# Patient Record
Sex: MALE | Race: OTHER
[De-identification: names, ages, dates, MRNs, and addresses within clinical notes are randomized per-mention and may not be internally consistent; named-entity substitution may affect disease eponyms.]

---

## 2018-02-28 ENCOUNTER — HOSPITAL ENCOUNTER (EMERGENCY)
Dept: HOSPITAL 15 - ER | Age: 58
LOS: 1 days | Discharge: HOME | End: 2018-03-01
Payer: COMMERCIAL

## 2018-02-28 VITALS — HEIGHT: 69 IN | WEIGHT: 267 LBS | BODY MASS INDEX: 39.55 KG/M2
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
## 2018-02-28 DIAGNOSIS — S76.311A: ICD-10-CM
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
## 2018-02-28 DIAGNOSIS — Z88.0: ICD-10-CM

## 2018-02-28 DIAGNOSIS — S76.312A: Primary | ICD-10-CM
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
## 2018-02-28 PROCEDURE — 99283 EMERGENCY DEPT VISIT LOW MDM: CPT

## 2018-02-28 PROCEDURE — 93005 ELECTROCARDIOGRAM TRACING: CPT

## 2018-02-28 PROCEDURE — 96372 THER/PROPH/DIAG INJ SC/IM: CPT

## 2018-03-01 VITALS — DIASTOLIC BLOOD PRESSURE: 95 MMHG | SYSTOLIC BLOOD PRESSURE: 140 MMHG

## 2018-12-29 ENCOUNTER — HOSPITAL ENCOUNTER (EMERGENCY)
Dept: HOSPITAL 15 - ER | Age: 58
Discharge: HOME | End: 2018-12-29
Payer: COMMERCIAL

## 2018-12-29 VITALS — WEIGHT: 242 LBS | BODY MASS INDEX: 34.65 KG/M2 | HEIGHT: 70 IN

## 2018-12-29 VITALS — SYSTOLIC BLOOD PRESSURE: 142 MMHG | DIASTOLIC BLOOD PRESSURE: 81 MMHG

## 2018-12-29 DIAGNOSIS — I10: ICD-10-CM

## 2018-12-29 DIAGNOSIS — R94.5: ICD-10-CM

## 2018-12-29 DIAGNOSIS — Z87.891: ICD-10-CM

## 2018-12-29 DIAGNOSIS — J20.9: Primary | ICD-10-CM

## 2018-12-29 LAB
ALBUMIN SERPL-MCNC: 4 G/DL (ref 3.4–5)
ALP SERPL-CCNC: 127 U/L (ref 45–117)
ALT SERPL-CCNC: 65 U/L (ref 16–61)
AMYLASE SERPL-CCNC: 42 U/L (ref 25–115)
ANION GAP SERPL CALCULATED.3IONS-SCNC: 5 MMOL/L (ref 5–15)
APTT PPP: 25.4 SEC (ref 23.78–33.04)
BILIRUB SERPL-MCNC: 0.3 MG/DL (ref 0.2–1)
BUN SERPL-MCNC: 12 MG/DL (ref 7–18)
BUN/CREAT SERPL: 10.9
CALCIUM SERPL-MCNC: 8.9 MG/DL (ref 8.5–10.1)
CHLORIDE SERPL-SCNC: 104 MMOL/L (ref 98–107)
CO2 SERPL-SCNC: 27 MMOL/L (ref 21–32)
GLUCOSE SERPL-MCNC: 94 MG/DL (ref 74–106)
HCT VFR BLD AUTO: 43.6 % (ref 41–53)
HGB BLD-MCNC: 14.6 G/DL (ref 13.5–17.5)
INR PPP: 0.91 (ref 0.9–1.15)
LIPASE SERPL-CCNC: 151 U/L (ref 73–393)
MAGNESIUM SERPL-MCNC: 2.4 MG/DL (ref 1.6–2.6)
MCH RBC QN AUTO: 29.6 PG (ref 28–32)
MCV RBC AUTO: 88.7 FL (ref 80–100)
NRBC BLD QL AUTO: 0 %
POTASSIUM SERPL-SCNC: 4 MMOL/L (ref 3.5–5.1)
PROT SERPL-MCNC: 7.9 G/DL (ref 6.4–8.2)
PROTHROMBIN TIME: 9.8 SEC (ref 9.27–12.13)
SODIUM SERPL-SCNC: 136 MMOL/L (ref 136–145)

## 2018-12-29 PROCEDURE — 84484 ASSAY OF TROPONIN QUANT: CPT

## 2018-12-29 PROCEDURE — 36415 COLL VENOUS BLD VENIPUNCTURE: CPT

## 2018-12-29 PROCEDURE — 83735 ASSAY OF MAGNESIUM: CPT

## 2018-12-29 PROCEDURE — 82150 ASSAY OF AMYLASE: CPT

## 2018-12-29 PROCEDURE — 83690 ASSAY OF LIPASE: CPT

## 2018-12-29 PROCEDURE — 80053 COMPREHEN METABOLIC PANEL: CPT

## 2018-12-29 PROCEDURE — 85610 PROTHROMBIN TIME: CPT

## 2018-12-29 PROCEDURE — 71045 X-RAY EXAM CHEST 1 VIEW: CPT

## 2018-12-29 PROCEDURE — 85379 FIBRIN DEGRADATION QUANT: CPT

## 2018-12-29 PROCEDURE — 96372 THER/PROPH/DIAG INJ SC/IM: CPT

## 2018-12-29 PROCEDURE — 85025 COMPLETE CBC W/AUTO DIFF WBC: CPT

## 2018-12-29 PROCEDURE — 83605 ASSAY OF LACTIC ACID: CPT

## 2018-12-29 PROCEDURE — 93005 ELECTROCARDIOGRAM TRACING: CPT

## 2018-12-29 PROCEDURE — 87040 BLOOD CULTURE FOR BACTERIA: CPT

## 2018-12-29 PROCEDURE — 94761 N-INVAS EAR/PLS OXIMETRY MLT: CPT

## 2018-12-29 PROCEDURE — 85730 THROMBOPLASTIN TIME PARTIAL: CPT

## 2018-12-29 PROCEDURE — 99284 EMERGENCY DEPT VISIT MOD MDM: CPT

## 2020-06-05 ENCOUNTER — HOSPITAL ENCOUNTER (INPATIENT)
Dept: HOSPITAL 15 - ER | Age: 60
LOS: 21 days | Discharge: HOME | DRG: 56 | End: 2020-06-26
Attending: INTERNAL MEDICINE | Admitting: INTERNAL MEDICINE
Payer: COMMERCIAL

## 2020-06-05 VITALS — BODY MASS INDEX: 38.96 KG/M2 | WEIGHT: 263.01 LBS | HEIGHT: 69 IN

## 2020-06-05 VITALS — SYSTOLIC BLOOD PRESSURE: 113 MMHG | DIASTOLIC BLOOD PRESSURE: 68 MMHG

## 2020-06-05 DIAGNOSIS — I10: ICD-10-CM

## 2020-06-05 DIAGNOSIS — J96.01: ICD-10-CM

## 2020-06-05 DIAGNOSIS — G70.01: Primary | ICD-10-CM

## 2020-06-05 DIAGNOSIS — J44.9: ICD-10-CM

## 2020-06-05 DIAGNOSIS — Z88.0: ICD-10-CM

## 2020-06-05 DIAGNOSIS — E03.9: ICD-10-CM

## 2020-06-05 DIAGNOSIS — F41.9: ICD-10-CM

## 2020-06-05 DIAGNOSIS — D72.829: ICD-10-CM

## 2020-06-05 DIAGNOSIS — Z20.828: ICD-10-CM

## 2020-06-05 DIAGNOSIS — R13.12: ICD-10-CM

## 2020-06-05 DIAGNOSIS — K22.0: ICD-10-CM

## 2020-06-05 DIAGNOSIS — D15.0: ICD-10-CM

## 2020-06-05 DIAGNOSIS — J98.11: ICD-10-CM

## 2020-06-05 DIAGNOSIS — Z87.891: ICD-10-CM

## 2020-06-05 DIAGNOSIS — E66.9: ICD-10-CM

## 2020-06-05 DIAGNOSIS — Z86.73: ICD-10-CM

## 2020-06-05 LAB
ALBUMIN SERPL-MCNC: 4 G/DL (ref 3.4–5)
ALP SERPL-CCNC: 100 U/L (ref 45–117)
ALT SERPL-CCNC: 99 U/L (ref 16–61)
ANION GAP SERPL CALCULATED.3IONS-SCNC: 6 MMOL/L (ref 5–15)
BILIRUB SERPL-MCNC: 0.4 MG/DL (ref 0.2–1)
BUN SERPL-MCNC: 17 MG/DL (ref 7–18)
BUN/CREAT SERPL: 13.9
CALCIUM SERPL-MCNC: 9.2 MG/DL (ref 8.5–10.1)
CHLORIDE SERPL-SCNC: 101 MMOL/L (ref 98–107)
CO2 SERPL-SCNC: 27 MMOL/L (ref 21–32)
GLUCOSE SERPL-MCNC: 100 MG/DL (ref 74–106)
HCT VFR BLD AUTO: 45.4 % (ref 41–53)
HGB BLD-MCNC: 15.3 G/DL (ref 13.5–17.5)
MCH RBC QN AUTO: 30.2 PG (ref 28–32)
MCV RBC AUTO: 89.8 FL (ref 80–100)
NRBC BLD QL AUTO: 0 %
POTASSIUM SERPL-SCNC: 3.6 MMOL/L (ref 3.5–5.1)
PROT SERPL-MCNC: 7.6 G/DL (ref 6.4–8.2)
SODIUM SERPL-SCNC: 134 MMOL/L (ref 136–145)

## 2020-06-05 PROCEDURE — 71045 X-RAY EXAM CHEST 1 VIEW: CPT

## 2020-06-05 PROCEDURE — 94668 MNPJ CHEST WALL SBSQ: CPT

## 2020-06-05 PROCEDURE — 85025 COMPLETE CBC W/AUTO DIFF WBC: CPT

## 2020-06-05 PROCEDURE — 92507 TX SP LANG VOICE COMM INDIV: CPT

## 2020-06-05 PROCEDURE — 80074 ACUTE HEPATITIS PANEL: CPT

## 2020-06-05 PROCEDURE — 84443 ASSAY THYROID STIM HORMONE: CPT

## 2020-06-05 PROCEDURE — 87081 CULTURE SCREEN ONLY: CPT

## 2020-06-05 PROCEDURE — 80048 BASIC METABOLIC PNL TOTAL CA: CPT

## 2020-06-05 PROCEDURE — 71275 CT ANGIOGRAPHY CHEST: CPT

## 2020-06-05 PROCEDURE — 81001 URINALYSIS AUTO W/SCOPE: CPT

## 2020-06-05 PROCEDURE — 74220 X-RAY XM ESOPHAGUS 1CNTRST: CPT

## 2020-06-05 PROCEDURE — 82805 BLOOD GASES W/O2 SATURATION: CPT

## 2020-06-05 PROCEDURE — 36600 WITHDRAWAL OF ARTERIAL BLOOD: CPT

## 2020-06-05 PROCEDURE — 36415 COLL VENOUS BLD VENIPUNCTURE: CPT

## 2020-06-05 PROCEDURE — 70551 MRI BRAIN STEM W/O DYE: CPT

## 2020-06-05 PROCEDURE — 94667 MNPJ CHEST WALL 1ST: CPT

## 2020-06-05 PROCEDURE — 93306 TTE W/DOPPLER COMPLETE: CPT

## 2020-06-05 PROCEDURE — 93970 EXTREMITY STUDY: CPT

## 2020-06-05 PROCEDURE — 94640 AIRWAY INHALATION TREATMENT: CPT

## 2020-06-05 PROCEDURE — 80053 COMPREHEN METABOLIC PANEL: CPT

## 2020-06-05 PROCEDURE — 70360 X-RAY EXAM OF NECK: CPT

## 2020-06-05 PROCEDURE — 92610 EVALUATE SWALLOWING FUNCTION: CPT

## 2020-06-05 PROCEDURE — 70450 CT HEAD/BRAIN W/O DYE: CPT

## 2020-06-05 PROCEDURE — 80307 DRUG TEST PRSMV CHEM ANLYZR: CPT

## 2020-06-05 PROCEDURE — 84439 ASSAY OF FREE THYROXINE: CPT

## 2020-06-05 RX ADMIN — DEXTROSE AND SODIUM CHLORIDE SCH MLS/HR: 5; 450 INJECTION, SOLUTION INTRAVENOUS at 20:17

## 2020-06-05 NOTE — NUR
MS admit from ER

TETO,FARAZ PRADO admitted to tele/MS after SBAR received.  Patient oriented to Alicja Lopez, primary RN, unit, room, bed, and unit policies regarding patient care and visiting 
hours. Patient weighed by bedscale and encouraged to call if they need something. All 
questions and concerns addressed, patient verbalized understanding.

## 2020-06-06 VITALS — DIASTOLIC BLOOD PRESSURE: 70 MMHG | SYSTOLIC BLOOD PRESSURE: 103 MMHG

## 2020-06-06 VITALS — DIASTOLIC BLOOD PRESSURE: 68 MMHG | SYSTOLIC BLOOD PRESSURE: 103 MMHG

## 2020-06-06 VITALS — DIASTOLIC BLOOD PRESSURE: 60 MMHG | SYSTOLIC BLOOD PRESSURE: 94 MMHG

## 2020-06-06 VITALS — DIASTOLIC BLOOD PRESSURE: 69 MMHG | SYSTOLIC BLOOD PRESSURE: 102 MMHG

## 2020-06-06 VITALS — SYSTOLIC BLOOD PRESSURE: 102 MMHG | DIASTOLIC BLOOD PRESSURE: 69 MMHG

## 2020-06-06 VITALS — DIASTOLIC BLOOD PRESSURE: 79 MMHG | SYSTOLIC BLOOD PRESSURE: 112 MMHG

## 2020-06-06 LAB
ALCOHOL, URINE: < 3 MG/DL (ref 0–10)
AMPHETAMINES UR QL SCN: NEGATIVE
ANION GAP SERPL CALCULATED.3IONS-SCNC: 6 MMOL/L (ref 5–15)
BARBITURATES UR QL SCN: NEGATIVE
BENZODIAZ UR QL SCN: NEGATIVE
BUN SERPL-MCNC: 15 MG/DL (ref 7–18)
BUN/CREAT SERPL: 15.5
BZE UR QL SCN: NEGATIVE
CALCIUM SERPL-MCNC: 8.7 MG/DL (ref 8.5–10.1)
CANNABINOIDS UR QL SCN: NEGATIVE
CHLORIDE SERPL-SCNC: 104 MMOL/L (ref 98–107)
CO2 SERPL-SCNC: 28 MMOL/L (ref 21–32)
GLUCOSE SERPL-MCNC: 117 MG/DL (ref 74–106)
HCT VFR BLD AUTO: 41.6 % (ref 41–53)
HGB BLD-MCNC: 13.8 G/DL (ref 13.5–17.5)
MCH RBC QN AUTO: 30.1 PG (ref 28–32)
MCV RBC AUTO: 90.4 FL (ref 80–100)
NRBC BLD QL AUTO: 0.1 %
OPIATES UR QL SCN: NEGATIVE
PCP UR QL SCN: NEGATIVE
POTASSIUM SERPL-SCNC: 3.4 MMOL/L (ref 3.5–5.1)
SODIUM SERPL-SCNC: 138 MMOL/L (ref 136–145)

## 2020-06-06 RX ADMIN — ALBUTEROL SULFATE PRN MG: 2.5 SOLUTION RESPIRATORY (INHALATION) at 19:18

## 2020-06-06 RX ADMIN — DEXTROSE AND SODIUM CHLORIDE SCH MLS/HR: 5; 450 INJECTION, SOLUTION INTRAVENOUS at 01:36

## 2020-06-06 RX ADMIN — METHYLPREDNISOLONE SODIUM SUCCINATE SCH MG: 125 INJECTION, POWDER, FOR SOLUTION INTRAMUSCULAR; INTRAVENOUS at 22:29

## 2020-06-06 RX ADMIN — DEXTROSE AND SODIUM CHLORIDE SCH MLS/HR: 5; 450 INJECTION, SOLUTION INTRAVENOUS at 15:48

## 2020-06-06 RX ADMIN — DEXTROSE AND SODIUM CHLORIDE SCH MLS/HR: 5; 450 INJECTION, SOLUTION INTRAVENOUS at 08:41

## 2020-06-06 RX ADMIN — BUDESONIDE SCH MG: 0.5 SUSPENSION RESPIRATORY (INHALATION) at 19:19

## 2020-06-06 RX ADMIN — ALBUTEROL SULFATE PRN MG: 2.5 SOLUTION RESPIRATORY (INHALATION) at 13:22

## 2020-06-06 RX ADMIN — ENOXAPARIN SODIUM SCH MG: 40 INJECTION SUBCUTANEOUS at 08:41

## 2020-06-06 RX ADMIN — PANTOPRAZOLE SODIUM SCH MG: 40 INJECTION, POWDER, FOR SOLUTION INTRAVENOUS at 08:41

## 2020-06-06 RX ADMIN — DEXTROSE AND SODIUM CHLORIDE SCH MLS/HR: 5; 450 INJECTION, SOLUTION INTRAVENOUS at 22:29

## 2020-06-06 NOTE — NUR
Opening Shift Note

Assumed care of patient after receiving report from WILLI Hart. Patient awake and alert, 
resting in bed comfortably with no S/S of distress/SOB or pain. Call light within reach, bed 
in lowest position x2 side rails, HOB low fowlers. Instructed on POC and to call for assist 
PRN, will continue to monitor for changes Q1hr and PRN.

## 2020-06-06 NOTE — NUR
SPOKE TO DR. VALENCIA.

RN NOTIFIED DR. VALENCIA THAT THE PATIENT IS REQUESTING TO EAT. PER DR. VALENCIA, HE WANTS 
DR. PERKINS TO EVALUATE THE PATIENT PRIOR TO CHANGING THE DIET.

## 2020-06-06 NOTE — NUR
PATIENT STATED THAT HE IS HAVING DIFFICULTIES BREATHING. PATIENT SATURATING APPROXIMATELY 
93-94% ON RA. RN PLACED PATIENT ON 2L NC AND PATIENT IS SATURATING 97-98% ON 2L NC. RN PAGED 
DR. VALENCIA.

## 2020-06-06 NOTE — NUR
Med Rec

Reviewed current medication with patient, patient stated he is also taking a blood pressure 
medication that he doesn't know the name or dosage of.

## 2020-06-06 NOTE — NUR
Nutrition Assessment Notes



Please refer to link for full assessment notes.



Est Energy needs: 5285-3251 kcals (14-18 kcal/kgBW)

Est Protein needs: 109-145 gms/day (1.5-2.0 gm/kgIBW) d/t obesity



Will continue to monitor and reassess prn.

-------------------------------------------------------------------------------

Addendum: 06/06/20 at 1617 by Ginger Moreno RD

-------------------------------------------------------------------------------

Amended: Links added.

## 2020-06-06 NOTE — NUR
Opening Shift Note

Assumed care of patient, awake and alert. No S/S of distress. Patient denies having any SOB 
or pain. Pt stated that he does have "difficulties swallowing and talking". Patients voice 
is muffled. Patient also stated that his voice has been muffled for a few days. Bed in 
lowest and locked position with side rails UP X2 and call light in reach. Instructed on POC 
and to call for assist PRN, will continue to monitor for changes Q1hr and PRN.

## 2020-06-06 NOTE — NUR
SPOKE TO DR. VALENCIA.

RN UPDATED DR. VALENCIA ON PT STATUS. MD AWARE AND NEW ORDERS RECEIVED, READ BACK AND 
VERIFIED. SEE EMR FOR ORDERS.

## 2020-06-07 VITALS — SYSTOLIC BLOOD PRESSURE: 100 MMHG | DIASTOLIC BLOOD PRESSURE: 58 MMHG

## 2020-06-07 VITALS — SYSTOLIC BLOOD PRESSURE: 101 MMHG | DIASTOLIC BLOOD PRESSURE: 69 MMHG

## 2020-06-07 VITALS — DIASTOLIC BLOOD PRESSURE: 61 MMHG | SYSTOLIC BLOOD PRESSURE: 117 MMHG

## 2020-06-07 VITALS — DIASTOLIC BLOOD PRESSURE: 67 MMHG | SYSTOLIC BLOOD PRESSURE: 99 MMHG

## 2020-06-07 VITALS — SYSTOLIC BLOOD PRESSURE: 115 MMHG | DIASTOLIC BLOOD PRESSURE: 70 MMHG

## 2020-06-07 RX ADMIN — PANTOPRAZOLE SODIUM SCH MG: 40 INJECTION, POWDER, FOR SOLUTION INTRAVENOUS at 21:28

## 2020-06-07 RX ADMIN — FLUCONAZOLE IN SODIUM CHLORIDE SCH MLS/HR: 2 INJECTION, SOLUTION INTRAVENOUS at 12:59

## 2020-06-07 RX ADMIN — ALBUTEROL SULFATE PRN MG: 2.5 SOLUTION RESPIRATORY (INHALATION) at 00:56

## 2020-06-07 RX ADMIN — ENOXAPARIN SODIUM SCH MG: 40 INJECTION SUBCUTANEOUS at 10:04

## 2020-06-07 RX ADMIN — ALBUTEROL SULFATE PRN MG: 2.5 SOLUTION RESPIRATORY (INHALATION) at 15:53

## 2020-06-07 RX ADMIN — PANTOPRAZOLE SODIUM SCH MG: 40 INJECTION, POWDER, FOR SOLUTION INTRAVENOUS at 10:04

## 2020-06-07 RX ADMIN — DEXTROSE AND SODIUM CHLORIDE SCH MLS/HR: 5; 450 INJECTION, SOLUTION INTRAVENOUS at 17:55

## 2020-06-07 RX ADMIN — METHYLPREDNISOLONE SODIUM SUCCINATE SCH MG: 125 INJECTION, POWDER, FOR SOLUTION INTRAMUSCULAR; INTRAVENOUS at 10:04

## 2020-06-07 RX ADMIN — ALBUTEROL SULFATE PRN MG: 2.5 SOLUTION RESPIRATORY (INHALATION) at 20:10

## 2020-06-07 RX ADMIN — METHYLPREDNISOLONE SODIUM SUCCINATE SCH MG: 125 INJECTION, POWDER, FOR SOLUTION INTRAMUSCULAR; INTRAVENOUS at 21:29

## 2020-06-07 RX ADMIN — BUDESONIDE SCH MG: 0.5 SUSPENSION RESPIRATORY (INHALATION) at 20:10

## 2020-06-07 RX ADMIN — ALBUTEROL SULFATE PRN MG: 2.5 SOLUTION RESPIRATORY (INHALATION) at 11:26

## 2020-06-07 RX ADMIN — DEXTROSE AND SODIUM CHLORIDE SCH MLS/HR: 5; 450 INJECTION, SOLUTION INTRAVENOUS at 05:26

## 2020-06-07 RX ADMIN — DEXTROSE AND SODIUM CHLORIDE SCH MLS/HR: 5; 450 INJECTION, SOLUTION INTRAVENOUS at 12:59

## 2020-06-07 RX ADMIN — BUDESONIDE SCH MG: 0.5 SUSPENSION RESPIRATORY (INHALATION) at 06:14

## 2020-06-07 RX ADMIN — ALBUTEROL SULFATE PRN MG: 2.5 SOLUTION RESPIRATORY (INHALATION) at 06:14

## 2020-06-07 RX ADMIN — FLUCONAZOLE IN SODIUM CHLORIDE SCH MLS/HR: 2 INJECTION, SOLUTION INTRAVENOUS at 10:05

## 2020-06-07 NOTE — NUR
SWALLOW EVALUATED. PATIENT HAS NATURAL TEETH UPPER, SOME LOWER, SOME MISSING. PATIENT ABLE 
TO FOLLOW DIRECTIONS. PATIENT ABLE TO TOLERATE PUREE DIET TEXTURE WITH NECTAR THICKENED 
LIQUIDS WITH NO OVERT SIGNS OR SYMPTOMS OF ASPIRATION. PATIENT COUGHED ON THIN LIQUIDS. 
PATIENT EDUCATED IN SAFETY OF SWALLOW INCLUDING SMALL BITES AND SIPS, SLOW RATE AND 
ALTERNATING SOLID AND LIQUIDS. NURSING NOTIFIED.

## 2020-06-07 NOTE — NUR
PATIENT STATED THAT HE IS "HAVING DIFFICULTIES BREATHING". PATIENT IS STANDING AT BEDSIDE 
AND WHEN RN ENCOURAGED THE PATIENT TO SIT DOWN, THE PATIENT STATED, "I CAN'T SIT, IT IS MUCH 
EASIER TO BREATH WHEN I STAND". AFTER ASSESSING THE PATIENT, THE PATIENTS LUNGS SOUND COARSE 
AND THE PATIENTS OXYGEN SATURATION IS APPROXIMATELY 90% ON RA. RN PLACED THE PATIENT ON 2L 
NC AND THE PATIENT BEGAN TO SATURATE APPROXIMATELY 94%. PATIENT STATED "THE DIFFICULTIES 
BREATHING JUST COME ON OUT OF NO WHERE AND FAST". PATIENT WAS EVENTUALLY ABLE TO SIT IN A 
CHAIR AT BEDSIDE AND STATED THAT HE IS BEGINNING TO "BREATH BETTER". RN PAGED RT AND MD.

## 2020-06-07 NOTE — NUR
RN PAGED RT FOR BREATHING TX. ALBUTEROL PRN GIVEN AT APPROXIMATELY 1126. EXPLAINED TO RN A 
ONCE TIME TX HAS TO BE ORDERED FOR PT. PT HAD A BOWEL MOVEMENT ON THE FLOOR. WILLI JARVIS AT 
BEDSIDE.

## 2020-06-07 NOTE — NUR
SPOKE TO DR. VALENCIA. 

RN UPDATED DR. LUAN ON PATIENTS SOB AND OXYGEN SATURATION OF 90% ON RA AND PATIENT 
PLACED ON 3L O2 VIA NC WITH A SATURATION OF 94%. MD AWARE AND NEW ORDERS RECEIVED, READ BACK 
AND VERIFIED. SEE EMR FOR ORDERS.

## 2020-06-07 NOTE — NUR
Opening Shift Note

Assumed care of patient, awake and alert. No S/S of distress. Patient denies having any SOB 
or pain. Pt stated that his difficulties in swallowing and talking "are better today". 
Patients voice is muffled, but has improved since yesterday. Bed in lowest and locked 
position with side rails UP X2 and call light in reach. Instructed on POC and to call for 
assist PRN, will continue to monitor for changes Q1hr and PRN.

## 2020-06-07 NOTE — NUR
Opening Shift Note

Assumed care of patient, awake and alert.  No S/S of distress/SOB or pain.  Instructed on 
POC and to be NPO after MN, for Barium swallow tomorrow. Instructed to call for assist PRN, 
patient verbalized understanding. Safety precaution in place, call light within reach, will 
continue to monitor for changes Q1hr and PRN.

## 2020-06-08 VITALS — SYSTOLIC BLOOD PRESSURE: 126 MMHG | DIASTOLIC BLOOD PRESSURE: 90 MMHG

## 2020-06-08 VITALS — SYSTOLIC BLOOD PRESSURE: 124 MMHG | DIASTOLIC BLOOD PRESSURE: 72 MMHG

## 2020-06-08 VITALS — SYSTOLIC BLOOD PRESSURE: 111 MMHG | DIASTOLIC BLOOD PRESSURE: 87 MMHG

## 2020-06-08 VITALS — SYSTOLIC BLOOD PRESSURE: 111 MMHG | DIASTOLIC BLOOD PRESSURE: 78 MMHG

## 2020-06-08 VITALS — SYSTOLIC BLOOD PRESSURE: 107 MMHG | DIASTOLIC BLOOD PRESSURE: 70 MMHG

## 2020-06-08 RX ADMIN — ALBUTEROL SULFATE PRN MG: 2.5 SOLUTION RESPIRATORY (INHALATION) at 19:02

## 2020-06-08 RX ADMIN — FLUCONAZOLE IN SODIUM CHLORIDE SCH MLS/HR: 2 INJECTION, SOLUTION INTRAVENOUS at 11:00

## 2020-06-08 RX ADMIN — DEXTROSE AND SODIUM CHLORIDE SCH MLS/HR: 5; 450 INJECTION, SOLUTION INTRAVENOUS at 06:00

## 2020-06-08 RX ADMIN — ENOXAPARIN SODIUM SCH MG: 120 INJECTION SUBCUTANEOUS at 21:54

## 2020-06-08 RX ADMIN — FLUCONAZOLE IN SODIUM CHLORIDE SCH MLS/HR: 2 INJECTION, SOLUTION INTRAVENOUS at 10:12

## 2020-06-08 RX ADMIN — ALBUTEROL SULFATE PRN MG: 2.5 SOLUTION RESPIRATORY (INHALATION) at 23:30

## 2020-06-08 RX ADMIN — METHYLPREDNISOLONE SODIUM SUCCINATE SCH MG: 125 INJECTION, POWDER, FOR SOLUTION INTRAMUSCULAR; INTRAVENOUS at 21:54

## 2020-06-08 RX ADMIN — ALBUTEROL SULFATE PRN MG: 2.5 SOLUTION RESPIRATORY (INHALATION) at 10:04

## 2020-06-08 RX ADMIN — PANTOPRAZOLE SODIUM SCH MG: 40 INJECTION, POWDER, FOR SOLUTION INTRAVENOUS at 21:53

## 2020-06-08 RX ADMIN — BUDESONIDE SCH MG: 0.5 SUSPENSION RESPIRATORY (INHALATION) at 10:04

## 2020-06-08 RX ADMIN — PANTOPRAZOLE SODIUM SCH MG: 40 INJECTION, POWDER, FOR SOLUTION INTRAVENOUS at 10:12

## 2020-06-08 RX ADMIN — BUDESONIDE SCH MG: 0.5 SUSPENSION RESPIRATORY (INHALATION) at 19:02

## 2020-06-08 RX ADMIN — ALBUTEROL SULFATE PRN MG: 2.5 SOLUTION RESPIRATORY (INHALATION) at 02:34

## 2020-06-08 RX ADMIN — ENOXAPARIN SODIUM SCH MG: 40 INJECTION SUBCUTANEOUS at 10:00

## 2020-06-08 RX ADMIN — DEXTROSE AND SODIUM CHLORIDE SCH MLS/HR: 5; 450 INJECTION, SOLUTION INTRAVENOUS at 01:52

## 2020-06-08 RX ADMIN — METHYLPREDNISOLONE SODIUM SUCCINATE SCH MG: 125 INJECTION, POWDER, FOR SOLUTION INTRAMUSCULAR; INTRAVENOUS at 10:12

## 2020-06-08 NOTE — NUR
Respiratory note:

Administered PRN medneb tx per pt's request due to SOB. HR 93, RR 22, SPO2 98% on 3lpm nasal 
cannula. Officers at bedside. Pt aware to call for RT again if needed.

## 2020-06-08 NOTE — NUR
Respiratory note: PRN ALBUTEROL MED-NEB ADMINISTERED AT THIS TIME FOR PATIENT C/O SOB. RN 
ALEJANDRA AT BEDSIDE AND AWARE OF MEDICATION ADMINISTRATION.

## 2020-06-08 NOTE — NUR
PATIENT C/O SOB. PAGED RESP STAT. O2 SAT 96% RECOMMEND CXR STAT. PATIENT SITTING UP IN 
CHAIR, TOLERATING WELL. WILL CONTINUE TO MONITOR.

## 2020-06-08 NOTE — NUR
Patient complained of SOB, restless and transferred to chair. Bumped up O2 at 4 Lpm/NC, O2 
Sat at 96%. RT at bedside. Paged Dr. Price, awaiting call back

## 2020-06-08 NOTE — NUR
Dr. Price called and updated on patient's status. Received order at this time and read 
back. Received also an order to put Pulmo Consult for SOB. Acknowledged and will carry out 
order.

## 2020-06-08 NOTE — NUR
Called CT Scan and informed of Stat CT Angio of the chest. Per Staff, machine is not 
available at this time because it was used by Covid patient. Put patient on NPO at this time

## 2020-06-08 NOTE — NUR
OPENING NOTE

Assumed care of patient, awake and alert.  No S/S of distress/SOB or pain. POC procedure in 
radiology esophageal barium swallow. Instructed to call for assist PRN, patient verbalized 
understanding. Safety precaution in place, call light within reach, long-term guards at bedside, 
will continue to monitor for changes Q1hr and PRN.

## 2020-06-08 NOTE — NUR
Nutrition Followup Notes



WT: 112.4 kg



Pt with RT when rounded this am. per records pt with dysphagia and to have barium test 
today. per records pt s/p ST eval and on pureed diet with no PO recorded yet. F/u mod 3-5 
days



Est Energy needs: 3255-6298 kcals (14-18 kcal/kgBW), Est Protein needs: 109-145 gms/day 
(1.5-2.0 gm/kgIBW) d/t obesity. Will continue to monitor and reassess prn.



LABS:  H, rest lab wnl



GI: Last BM noted on 6/02 per RN doc.



BS: 20 low risk, Please refer to wound assessment report for full details.



PES: 1) Increased nutrient needs  aeb pt with dysphagia, pending swallow eval r/t pt with no 
PO intake

2) Obesity aeb 152% IBW and BMI of 36.0 kg/m2  r/t energy intake in excess of energy needs





Comments 

Will continue to closely monitor pertinent labs, NPO status, PO intake and skin status prn. 
Will followup in 3-5 days

     

1) continue assistance with meals. 2) consider ensure Enlive 1 carton bid if PO is low. 3) 
consider alternate nutrition support if GI is not accessible. 4) Continue current plan of 
care

## 2020-06-09 VITALS — SYSTOLIC BLOOD PRESSURE: 124 MMHG | DIASTOLIC BLOOD PRESSURE: 82 MMHG

## 2020-06-09 VITALS — SYSTOLIC BLOOD PRESSURE: 129 MMHG | DIASTOLIC BLOOD PRESSURE: 75 MMHG

## 2020-06-09 VITALS — SYSTOLIC BLOOD PRESSURE: 148 MMHG | DIASTOLIC BLOOD PRESSURE: 87 MMHG

## 2020-06-09 VITALS — DIASTOLIC BLOOD PRESSURE: 96 MMHG | SYSTOLIC BLOOD PRESSURE: 142 MMHG

## 2020-06-09 VITALS — SYSTOLIC BLOOD PRESSURE: 147 MMHG | DIASTOLIC BLOOD PRESSURE: 97 MMHG

## 2020-06-09 LAB
ALBUMIN SERPL-MCNC: 3.3 G/DL (ref 3.4–5)
ALP SERPL-CCNC: 73 U/L (ref 45–117)
ALT SERPL-CCNC: 36 U/L (ref 16–61)
ANION GAP SERPL CALCULATED.3IONS-SCNC: 7 MMOL/L (ref 5–15)
BILIRUB SERPL-MCNC: 0.3 MG/DL (ref 0.2–1)
BUN SERPL-MCNC: 18 MG/DL (ref 7–18)
BUN/CREAT SERPL: 15
CALCIUM SERPL-MCNC: 9 MG/DL (ref 8.5–10.1)
CHLORIDE SERPL-SCNC: 107 MMOL/L (ref 98–107)
CO2 SERPL-SCNC: 25 MMOL/L (ref 21–32)
GLUCOSE SERPL-MCNC: 117 MG/DL (ref 74–106)
HCT VFR BLD AUTO: 41.1 % (ref 41–53)
HGB BLD-MCNC: 13.6 G/DL (ref 13.5–17.5)
MCH RBC QN AUTO: 30.2 PG (ref 28–32)
MCV RBC AUTO: 91.1 FL (ref 80–100)
NRBC BLD QL AUTO: 0 %
POTASSIUM SERPL-SCNC: 4.4 MMOL/L (ref 3.5–5.1)
PROT SERPL-MCNC: 6.6 G/DL (ref 6.4–8.2)
SODIUM SERPL-SCNC: 139 MMOL/L (ref 136–145)

## 2020-06-09 RX ADMIN — BUDESONIDE SCH MG: 0.5 SUSPENSION RESPIRATORY (INHALATION) at 06:08

## 2020-06-09 RX ADMIN — PANTOPRAZOLE SODIUM SCH MG: 40 INJECTION, POWDER, FOR SOLUTION INTRAVENOUS at 10:16

## 2020-06-09 RX ADMIN — ALBUTEROL SULFATE SCH MG: 2.5 SOLUTION RESPIRATORY (INHALATION) at 22:47

## 2020-06-09 RX ADMIN — IPRATROPIUM BROMIDE SCH MG: 0.5 SOLUTION RESPIRATORY (INHALATION) at 22:45

## 2020-06-09 RX ADMIN — FLUCONAZOLE IN SODIUM CHLORIDE SCH MLS/HR: 2 INJECTION, SOLUTION INTRAVENOUS at 10:16

## 2020-06-09 RX ADMIN — PANTOPRAZOLE SODIUM SCH MG: 40 INJECTION, POWDER, FOR SOLUTION INTRAVENOUS at 21:35

## 2020-06-09 RX ADMIN — ALBUTEROL SULFATE PRN MG: 2.5 SOLUTION RESPIRATORY (INHALATION) at 06:08

## 2020-06-09 RX ADMIN — BUDESONIDE SCH MG: 0.5 SUSPENSION RESPIRATORY (INHALATION) at 22:45

## 2020-06-09 RX ADMIN — METHYLPREDNISOLONE SODIUM SUCCINATE SCH MG: 125 INJECTION, POWDER, FOR SOLUTION INTRAMUSCULAR; INTRAVENOUS at 10:16

## 2020-06-09 RX ADMIN — ALBUTEROL SULFATE PRN MG: 2.5 SOLUTION RESPIRATORY (INHALATION) at 19:26

## 2020-06-09 RX ADMIN — FLUCONAZOLE IN SODIUM CHLORIDE SCH MLS/HR: 2 INJECTION, SOLUTION INTRAVENOUS at 11:25

## 2020-06-09 RX ADMIN — PYRIDOSTIGMINE BROMIDE SCH MG: 60 TABLET ORAL at 21:35

## 2020-06-09 RX ADMIN — ATORVASTATIN CALCIUM SCH MG: 20 TABLET, FILM COATED ORAL at 21:35

## 2020-06-09 RX ADMIN — PYRIDOSTIGMINE BROMIDE SCH MG: 60 TABLET ORAL at 19:15

## 2020-06-09 RX ADMIN — ACETYLCYSTEINE SCH MG: 200 INHALANT RESPIRATORY (INHALATION) at 22:46

## 2020-06-09 RX ADMIN — ENOXAPARIN SODIUM SCH MG: 120 INJECTION SUBCUTANEOUS at 10:16

## 2020-06-09 NOTE — NUR
Report received from WILLI Stubbs, day shift after Dr. Quinones gave orders to transfer pt to 
[facility] 2/2 diagnosis of possible Myasthenia Gravis and RT care needed now and q4h. Pt not seen 
by this RN. Charge nurse, WILLI Mclain, informed this RN to give report to WILLI Lozada; done. 
Tele box requested by this RN and obtained by CNA; placed on pt by CNA.

## 2020-06-09 NOTE — NUR
NEGATIVE INSPIRATORY FORCE VIA NIF MANOMETER PERFORMED.  



Pt gave 4 good effort attempts with proper education/instructions and seal.

results are as followed:

-[id number]

-[id number]

-[id number]

-[id number]

## 2020-06-09 NOTE — NUR
Dr. Quinones/New orders/Transfer

Dr. Quinones was in to see the patient. He does not think the patient had a stroke. He 
thinks it's possible the patient has Myasthenia Gravis. He did a test where he put an ice 
pack on the droopy eyed side of the patient's face and after only a few minutes, the patient 
was able to open his eye all the way. He wrote an order for a medication. He also ordered 
Forced vital capacity to be done by RT now and every 4 hours. RT was paged. Due to this and 
the possibility of respiratory failure, he wants the patient transferred to [facility]. Dr. Orozco 
was on the floor making rounds and was notified about the patient. This nurse also notified 
the charge nurse who stated she will let the house supervisor know. All of this information 
was passed along to the night shift nurse.

## 2020-06-09 NOTE — NUR
Brought patient down for CT Angio of the chest, patient unable to lay down and became 
anxious. 

Brought patient up in the room with residential guards

## 2020-06-09 NOTE — NUR
NEGATIVE INSPIRATORY FORCE VIA NIF MANOMETER PERFORMED.  



Pt gave 3 good effort attempts pre and post with proper education/instructions and seal.

results are as followed:



PRE TX

-[id number]

-[id number]

-[id number]



POST TX

-[id number]

-[id number]

-[id number]

## 2020-06-09 NOTE — NUR
Pt expressed in the beginning of the tx that he feels much better and that the txs seem to 
be working. 

During txs via ezpap pt states "these long treatments are exhausting, am I gonna get theses 
all the time now". Explained/educated pt on med scheduled and on the need for this lung 
exercise due to atelectasis per cxr. Pt explains his understanding and that he will comply 
pt stated "ill do whatever you guys nee me to do, to get better".

## 2020-06-09 NOTE — NUR
COMMUNICATED NIF RESULTS WITH DR LINO, PT IS TO BE MONITORED FOR DECLINE AND TO CONTINUE Q4 
NIF WITH TXS PRE AND POST.

## 2020-06-10 VITALS — DIASTOLIC BLOOD PRESSURE: 86 MMHG | SYSTOLIC BLOOD PRESSURE: 118 MMHG

## 2020-06-10 VITALS — DIASTOLIC BLOOD PRESSURE: 72 MMHG | SYSTOLIC BLOOD PRESSURE: 109 MMHG

## 2020-06-10 VITALS — DIASTOLIC BLOOD PRESSURE: 56 MMHG | SYSTOLIC BLOOD PRESSURE: 94 MMHG

## 2020-06-10 VITALS — DIASTOLIC BLOOD PRESSURE: 77 MMHG | SYSTOLIC BLOOD PRESSURE: 118 MMHG

## 2020-06-10 VITALS — SYSTOLIC BLOOD PRESSURE: 125 MMHG | DIASTOLIC BLOOD PRESSURE: 59 MMHG

## 2020-06-10 VITALS — SYSTOLIC BLOOD PRESSURE: 110 MMHG | DIASTOLIC BLOOD PRESSURE: 70 MMHG

## 2020-06-10 VITALS — DIASTOLIC BLOOD PRESSURE: 86 MMHG | SYSTOLIC BLOOD PRESSURE: 114 MMHG

## 2020-06-10 VITALS — SYSTOLIC BLOOD PRESSURE: 98 MMHG | DIASTOLIC BLOOD PRESSURE: 65 MMHG

## 2020-06-10 RX ADMIN — ALBUTEROL SULFATE SCH MG: 2.5 SOLUTION RESPIRATORY (INHALATION) at 22:38

## 2020-06-10 RX ADMIN — PYRIDOSTIGMINE BROMIDE SCH MG: 60 TABLET ORAL at 18:13

## 2020-06-10 RX ADMIN — IPRATROPIUM BROMIDE SCH MG: 0.5 SOLUTION RESPIRATORY (INHALATION) at 22:38

## 2020-06-10 RX ADMIN — SODIUM CHLORIDE SCH ML: 9 INJECTION INTRAMUSCULAR; INTRAVENOUS; SUBCUTANEOUS at 14:56

## 2020-06-10 RX ADMIN — PANTOPRAZOLE SODIUM SCH MG: 40 INJECTION, POWDER, FOR SOLUTION INTRAVENOUS at 10:24

## 2020-06-10 RX ADMIN — IPRATROPIUM BROMIDE SCH MG: 0.5 SOLUTION RESPIRATORY (INHALATION) at 10:18

## 2020-06-10 RX ADMIN — SODIUM CHLORIDE SCH ML: 9 INJECTION INTRAMUSCULAR; INTRAVENOUS; SUBCUTANEOUS at 21:42

## 2020-06-10 RX ADMIN — PYRIDOSTIGMINE BROMIDE SCH MG: 60 TABLET ORAL at 05:36

## 2020-06-10 RX ADMIN — IPRATROPIUM BROMIDE SCH MG: 0.5 SOLUTION RESPIRATORY (INHALATION) at 15:07

## 2020-06-10 RX ADMIN — BUDESONIDE SCH MG: 0.5 SUSPENSION RESPIRATORY (INHALATION) at 10:18

## 2020-06-10 RX ADMIN — IPRATROPIUM BROMIDE SCH MG: 0.5 SOLUTION RESPIRATORY (INHALATION) at 06:38

## 2020-06-10 RX ADMIN — ACETYLCYSTEINE SCH MG: 200 INHALANT RESPIRATORY (INHALATION) at 15:08

## 2020-06-10 RX ADMIN — ACETYLCYSTEINE SCH MG: 200 INHALANT RESPIRATORY (INHALATION) at 06:39

## 2020-06-10 RX ADMIN — FLUCONAZOLE IN SODIUM CHLORIDE SCH MLS/HR: 2 INJECTION, SOLUTION INTRAVENOUS at 10:24

## 2020-06-10 RX ADMIN — ALBUTEROL SULFATE SCH MG: 2.5 SOLUTION RESPIRATORY (INHALATION) at 03:05

## 2020-06-10 RX ADMIN — ATORVASTATIN CALCIUM SCH MG: 20 TABLET, FILM COATED ORAL at 21:42

## 2020-06-10 RX ADMIN — FLUCONAZOLE IN SODIUM CHLORIDE SCH MLS/HR: 2 INJECTION, SOLUTION INTRAVENOUS at 12:00

## 2020-06-10 RX ADMIN — ALBUTEROL SULFATE SCH MG: 2.5 SOLUTION RESPIRATORY (INHALATION) at 19:50

## 2020-06-10 RX ADMIN — ACETYLCYSTEINE SCH MG: 200 INHALANT RESPIRATORY (INHALATION) at 22:42

## 2020-06-10 RX ADMIN — BUDESONIDE SCH MG: 0.5 SUSPENSION RESPIRATORY (INHALATION) at 19:50

## 2020-06-10 RX ADMIN — ALBUTEROL SULFATE SCH MG: 2.5 SOLUTION RESPIRATORY (INHALATION) at 06:39

## 2020-06-10 RX ADMIN — IPRATROPIUM BROMIDE SCH MG: 0.5 SOLUTION RESPIRATORY (INHALATION) at 03:05

## 2020-06-10 RX ADMIN — PANTOPRAZOLE SODIUM SCH MG: 40 INJECTION, POWDER, FOR SOLUTION INTRAVENOUS at 21:42

## 2020-06-10 RX ADMIN — ENOXAPARIN SODIUM SCH MG: 40 INJECTION SUBCUTANEOUS at 10:25

## 2020-06-10 RX ADMIN — PYRIDOSTIGMINE BROMIDE SCH MG: 60 TABLET ORAL at 11:56

## 2020-06-10 RX ADMIN — ALBUTEROL SULFATE SCH MG: 2.5 SOLUTION RESPIRATORY (INHALATION) at 10:18

## 2020-06-10 RX ADMIN — PYRIDOSTIGMINE BROMIDE SCH MG: 60 TABLET ORAL at 21:42

## 2020-06-10 RX ADMIN — IPRATROPIUM BROMIDE SCH MG: 0.5 SOLUTION RESPIRATORY (INHALATION) at 19:50

## 2020-06-10 RX ADMIN — ALBUTEROL SULFATE SCH MG: 2.5 SOLUTION RESPIRATORY (INHALATION) at 15:07

## 2020-06-10 NOTE — NUR
RT NOTE

PT WAS SEEN BY RT FOR HHN TX. PT TOLERATES WELL VIA MOUTHPIECE WITH EZ-PAP. PT HAD NIF 
ASSESSMENT PRE TX WITH 3 GOOD EFFORTS  READING -24, -22, -24 CMH2O. MANUAL CPT DONE AND 
TOLERATED WELL. POST TX NIF WITH 3 GOOD EFFORTS READING -25,-30,-25. PT APPEARS TO TOLERATE 
TX WELL.

-------------------------------------------------------------------------------

Addendum: 06/10/20 at 2326 by Lilian Ibarra RT

-------------------------------------------------------------------------------

Amended: Links added.

## 2020-06-10 NOTE — NUR
Negative Inspiratory Force measurement obtained. 



Pt gave 3 attempts with good effort pre and post tx, with proper education/instructions and 
seal around mouth piece.



PRE TX

-[id number]

-[id number]

-[id number]



POST TX

-[id number]

-[id number]

-[id number]

## 2020-06-10 NOTE — NUR
CT ANGIO

Attempted to lye patient flat to see if he could tolerate in prep for CT Angio, once 
patient's bed was placed flat, patient immediately began complaining of chest heaviness and 
inability to take a deep breath. Message left with Dr Quinones exchange to notify, awaiting 
return call. Informed Henrietta with radiology, verbalized understanding. Informed I will notify 
her of Dr Quinones orders. Patient set back up in high fowlers, verbalized symptoms 
resolved.

## 2020-06-10 NOTE — NUR
RT NOTE

'PT WAS SEEN BY RT FOR HHN TX. PT TOLERATES WELL VIA MOUTHPIECE WITH EZ-PAP. PT HAD NIF 
ASSESSMENT PRE TX WITH 3 GOOD EFFORTS  READING -22, -24, -22 CMH2O. MANUAL CPT DONE AND 
TOLERATED WELL. POST TX NIF WITH 3 GOOD EFFORTS READING -19,-21,-19. PT STATES ITS HARDER TO 
DO NIF POST TX BECAUSE THE EZ-PAP MAKES HIM TIRED. 

-------------------------------------------------------------------------------

Addendum: 06/10/20 at 2003 by Lilian Ibarra RT

-------------------------------------------------------------------------------

Amended: Links added.

## 2020-06-10 NOTE — NUR
Opening Shift Note

Assumed care of patient, awake, alert and oriented X4. No S/S of distress/SOB or pain. Right 
eye droop but patient able to open eye and see clearly, mouth symmetrical, verbalizing no 
difficulty with swallowing. O2 @ 3 LPM via nasal cannula with sats @ 94%.  Tele# 69, sinus 
bradycardia @ 54 bpm. IV to right antecubital, 20 gauge, patent and saline locked. 
Instructed on POC and to call for assist PRN, verbalized understanding. Bed locked, in 
lowest position, call light within reach, guards X2 at bedside, will continue to monitor for 
changes Q1hr and PRN.

## 2020-06-10 NOTE — NUR
Respiratory note:

At bedside for medneb tx. Pre-tx NIF attempted x3 with best effort measured at 30 cmH2O. 
Medneb tx administered with EZ-PAP and CPT, pt tolerated well, no adverse reactions noted. 
Post-tx NIF attempted x3 with best effort measured at 26 cmH2O. WILLI Beckford at bedside, aware 
of NIF measurements. Pt sitting in chair, back on 3lpm nasal cannula SPO2 95%, no s/s of 
respiratory distress noted.

## 2020-06-10 NOTE — NUR
NEUROLOGY

Dr Quinones at bedside for Neurology follow up. New orders received and followed through. 
Informed patient unable to tolerate lying flat, therefore unable to have CT Angio, 
verbalized will have patient try again tomorrow. Informed Anuradha, radiology, verbalized 
understanding.

## 2020-06-10 NOTE — NUR
Respiratory note:

At bedside for scheduled tx. Pre-tx NIF attempted x3 with good effort, measurements 
obtained: -22, -18, -18 cmH2O. Breathing tx administered with EZ-PAP, pt tolerated well, no 
adverse reactions noted. Pt says he is feeling better this morning, able to lay bed back a 
little more. Post-tx NIF attempted x3 with measurements obtained: -20, -20, -18 cmH2O. Pt 
back on 3lpm nasal cannula, SPO2 94%. No s/s of respiratory distress noted at this time. Pt 
aware to call for RT if needed. Officers at bedside.

## 2020-06-10 NOTE — NUR
Respiratory note:

At bedside for medneb tx. Pre-tx NIF attempted x3, best effort measured -28 cmH2O. Medneb tx 
administered with EZ-PAP and CPT, pt tolerated well, no adverse reactions noted. Post-tx NIF 
attempted x3, best effort measured -20 cmH2O. Pt resting in bed, no s/s of respiratory 
distress noted, 3lpm nasal cannula SPO2 97%. Officers at bedside.

## 2020-06-11 VITALS — DIASTOLIC BLOOD PRESSURE: 78 MMHG | SYSTOLIC BLOOD PRESSURE: 110 MMHG

## 2020-06-11 VITALS — SYSTOLIC BLOOD PRESSURE: 109 MMHG | DIASTOLIC BLOOD PRESSURE: 63 MMHG

## 2020-06-11 VITALS — DIASTOLIC BLOOD PRESSURE: 48 MMHG | SYSTOLIC BLOOD PRESSURE: 102 MMHG

## 2020-06-11 VITALS — SYSTOLIC BLOOD PRESSURE: 97 MMHG | DIASTOLIC BLOOD PRESSURE: 57 MMHG

## 2020-06-11 VITALS — SYSTOLIC BLOOD PRESSURE: 110 MMHG | DIASTOLIC BLOOD PRESSURE: 69 MMHG

## 2020-06-11 VITALS — DIASTOLIC BLOOD PRESSURE: 60 MMHG | SYSTOLIC BLOOD PRESSURE: 111 MMHG

## 2020-06-11 RX ADMIN — FLUCONAZOLE IN SODIUM CHLORIDE SCH MLS/HR: 2 INJECTION, SOLUTION INTRAVENOUS at 10:32

## 2020-06-11 RX ADMIN — BUDESONIDE SCH MG: 0.5 SUSPENSION RESPIRATORY (INHALATION) at 06:28

## 2020-06-11 RX ADMIN — ALBUTEROL SULFATE SCH MG: 2.5 SOLUTION RESPIRATORY (INHALATION) at 06:28

## 2020-06-11 RX ADMIN — ENOXAPARIN SODIUM SCH MG: 40 INJECTION SUBCUTANEOUS at 10:33

## 2020-06-11 RX ADMIN — IPRATROPIUM BROMIDE SCH MG: 0.5 SOLUTION RESPIRATORY (INHALATION) at 18:27

## 2020-06-11 RX ADMIN — IPRATROPIUM BROMIDE SCH MG: 0.5 SOLUTION RESPIRATORY (INHALATION) at 10:35

## 2020-06-11 RX ADMIN — ACETYLCYSTEINE SCH MG: 200 INHALANT RESPIRATORY (INHALATION) at 06:28

## 2020-06-11 RX ADMIN — ALBUTEROL SULFATE SCH MG: 2.5 SOLUTION RESPIRATORY (INHALATION) at 22:20

## 2020-06-11 RX ADMIN — PYRIDOSTIGMINE BROMIDE SCH MG: 60 TABLET ORAL at 12:04

## 2020-06-11 RX ADMIN — PYRIDOSTIGMINE BROMIDE SCH MG: 60 TABLET ORAL at 21:29

## 2020-06-11 RX ADMIN — BUDESONIDE SCH MG: 0.5 SUSPENSION RESPIRATORY (INHALATION) at 18:26

## 2020-06-11 RX ADMIN — ALBUTEROL SULFATE SCH MG: 2.5 SOLUTION RESPIRATORY (INHALATION) at 18:26

## 2020-06-11 RX ADMIN — ATORVASTATIN CALCIUM SCH MG: 20 TABLET, FILM COATED ORAL at 21:29

## 2020-06-11 RX ADMIN — IPRATROPIUM BROMIDE SCH MG: 0.5 SOLUTION RESPIRATORY (INHALATION) at 06:28

## 2020-06-11 RX ADMIN — PYRIDOSTIGMINE BROMIDE SCH MG: 60 TABLET ORAL at 18:07

## 2020-06-11 RX ADMIN — PANTOPRAZOLE SODIUM SCH MG: 40 INJECTION, POWDER, FOR SOLUTION INTRAVENOUS at 10:32

## 2020-06-11 RX ADMIN — IPRATROPIUM BROMIDE SCH MG: 0.5 SOLUTION RESPIRATORY (INHALATION) at 02:42

## 2020-06-11 RX ADMIN — IMMUNE GLOBULIN (HUMAN) SCH GRAMS: 10 INJECTION INTRAVENOUS; SUBCUTANEOUS at 22:30

## 2020-06-11 RX ADMIN — ALBUTEROL SULFATE SCH MG: 2.5 SOLUTION RESPIRATORY (INHALATION) at 10:35

## 2020-06-11 RX ADMIN — IPRATROPIUM BROMIDE SCH MG: 0.5 SOLUTION RESPIRATORY (INHALATION) at 14:20

## 2020-06-11 RX ADMIN — PYRIDOSTIGMINE BROMIDE SCH MG: 60 TABLET ORAL at 05:49

## 2020-06-11 RX ADMIN — ALBUTEROL SULFATE SCH MG: 2.5 SOLUTION RESPIRATORY (INHALATION) at 14:20

## 2020-06-11 RX ADMIN — ACETYLCYSTEINE SCH MG: 200 INHALANT RESPIRATORY (INHALATION) at 14:20

## 2020-06-11 RX ADMIN — SODIUM CHLORIDE SCH ML: 9 INJECTION INTRAMUSCULAR; INTRAVENOUS; SUBCUTANEOUS at 21:29

## 2020-06-11 RX ADMIN — SODIUM CHLORIDE SCH ML: 9 INJECTION INTRAMUSCULAR; INTRAVENOUS; SUBCUTANEOUS at 05:49

## 2020-06-11 RX ADMIN — FLUCONAZOLE IN SODIUM CHLORIDE SCH MLS/HR: 2 INJECTION, SOLUTION INTRAVENOUS at 12:04

## 2020-06-11 RX ADMIN — ALBUTEROL SULFATE SCH MG: 2.5 SOLUTION RESPIRATORY (INHALATION) at 02:42

## 2020-06-11 RX ADMIN — PANTOPRAZOLE SODIUM SCH MG: 40 INJECTION, POWDER, FOR SOLUTION INTRAVENOUS at 21:29

## 2020-06-11 RX ADMIN — SODIUM CHLORIDE SCH ML: 9 INJECTION INTRAMUSCULAR; INTRAVENOUS; SUBCUTANEOUS at 15:59

## 2020-06-11 RX ADMIN — IPRATROPIUM BROMIDE SCH MG: 0.5 SOLUTION RESPIRATORY (INHALATION) at 22:18

## 2020-06-11 RX ADMIN — ACETYLCYSTEINE SCH MG: 200 INHALANT RESPIRATORY (INHALATION) at 22:21

## 2020-06-11 NOTE — NUR
RESPIRATORY

Dr Orozco at bedside for respiratory consult follow up. Plan of care discussed with the 
patient, verbalized understanding.

## 2020-06-11 NOTE — NUR
RT NOTE

PT WAS SEEN BY RT FOR HHN TX. PT TOLERATES WELL VIA MOUTHPIECE WITH EZ-PAP. PT HAD NIF 
ASSESSMENT PRE TX WITH 3 GOOD EFFORTS  READING -26, -30, -30 CMH2O. MANUAL CPT DONE AND 
TOLERATED WELL. POST TX NIF WITH 3 GOOD EFFORTS READING -32,-30,-28. PT APPEARS TO TOLERATE 
TX WELL.

-------------------------------------------------------------------------------

Addendum: 06/11/20 at 0252 by Lilian Ibarra RT

-------------------------------------------------------------------------------

Amended: Links added.

## 2020-06-11 NOTE — NUR
Patient continues to be unable to lye flat, verbalized he feels he can't breath. Informed 
Anuradha, Radiology, verbalized understanding.

## 2020-06-11 NOTE — NUR
Opening Shift Note

Assumed care of patient, awake, alert and oriented X4. No S/S of distress/SOB or pain. Right 
eye droopy but patient able to open his eye when asked. O2 @ 2 LPM via nasal cannula with 
sats @ 98%. Tele# 69, sinus bradycardia @ 54 bpm. IV to right forearm, 20 gauge, patent and 
saline locked. Instructed on POC and to call for assist PRN, verbalized understanding. Bed 
locked, in lowest position, call light within reach, will continue to monitor for changes 
Q1hr and PRN.

-------------------------------------------------------------------------------

Addendum: 06/11/20 at 1208 by Analy Diggs RN

-------------------------------------------------------------------------------

Guards X2 at bedside.

## 2020-06-11 NOTE — NUR
open note

assumed care of pt. upon entering room pt awake, alert and oriented x4. pt on 2L nc no 
distress noted or expressed. pt denies any pain. pt updated on plan of care. pt given 
incentive spirometer, instructions on its use and was observed performing action correctly. 
pt vital capacity at this time 1250. bed locked, low and 2xrails up. call light in reach. 
this nurse to round q1hr and prn. encouraged pt to call as needed.

## 2020-06-12 VITALS — SYSTOLIC BLOOD PRESSURE: 114 MMHG | DIASTOLIC BLOOD PRESSURE: 70 MMHG

## 2020-06-12 VITALS — SYSTOLIC BLOOD PRESSURE: 103 MMHG | DIASTOLIC BLOOD PRESSURE: 68 MMHG

## 2020-06-12 VITALS — DIASTOLIC BLOOD PRESSURE: 64 MMHG | SYSTOLIC BLOOD PRESSURE: 100 MMHG

## 2020-06-12 VITALS — DIASTOLIC BLOOD PRESSURE: 70 MMHG | SYSTOLIC BLOOD PRESSURE: 111 MMHG

## 2020-06-12 VITALS — DIASTOLIC BLOOD PRESSURE: 79 MMHG | SYSTOLIC BLOOD PRESSURE: 112 MMHG

## 2020-06-12 RX ADMIN — PYRIDOSTIGMINE BROMIDE SCH MG: 60 TABLET ORAL at 22:04

## 2020-06-12 RX ADMIN — ENOXAPARIN SODIUM SCH MG: 40 INJECTION SUBCUTANEOUS at 09:14

## 2020-06-12 RX ADMIN — PYRIDOSTIGMINE BROMIDE SCH MG: 60 TABLET ORAL at 13:36

## 2020-06-12 RX ADMIN — IPRATROPIUM BROMIDE SCH MG: 0.5 SOLUTION RESPIRATORY (INHALATION) at 02:06

## 2020-06-12 RX ADMIN — IPRATROPIUM BROMIDE SCH MG: 0.5 SOLUTION RESPIRATORY (INHALATION) at 14:25

## 2020-06-12 RX ADMIN — IPRATROPIUM BROMIDE SCH MG: 0.5 SOLUTION RESPIRATORY (INHALATION) at 22:04

## 2020-06-12 RX ADMIN — IPRATROPIUM BROMIDE SCH MG: 0.5 SOLUTION RESPIRATORY (INHALATION) at 07:37

## 2020-06-12 RX ADMIN — BUDESONIDE SCH MG: 0.5 SUSPENSION RESPIRATORY (INHALATION) at 07:37

## 2020-06-12 RX ADMIN — PYRIDOSTIGMINE BROMIDE SCH MG: 60 TABLET ORAL at 17:53

## 2020-06-12 RX ADMIN — ALBUTEROL SULFATE SCH MG: 2.5 SOLUTION RESPIRATORY (INHALATION) at 22:03

## 2020-06-12 RX ADMIN — ALBUTEROL SULFATE SCH MG: 2.5 SOLUTION RESPIRATORY (INHALATION) at 10:58

## 2020-06-12 RX ADMIN — ALBUTEROL SULFATE SCH MG: 2.5 SOLUTION RESPIRATORY (INHALATION) at 07:37

## 2020-06-12 RX ADMIN — ACETYLCYSTEINE SCH MG: 200 INHALANT RESPIRATORY (INHALATION) at 14:25

## 2020-06-12 RX ADMIN — PANTOPRAZOLE SODIUM SCH MG: 40 INJECTION, POWDER, FOR SOLUTION INTRAVENOUS at 22:04

## 2020-06-12 RX ADMIN — IPRATROPIUM BROMIDE SCH MG: 0.5 SOLUTION RESPIRATORY (INHALATION) at 10:58

## 2020-06-12 RX ADMIN — BUDESONIDE SCH MG: 0.5 SUSPENSION RESPIRATORY (INHALATION) at 18:57

## 2020-06-12 RX ADMIN — ATORVASTATIN CALCIUM SCH MG: 20 TABLET, FILM COATED ORAL at 22:04

## 2020-06-12 RX ADMIN — ALBUTEROL SULFATE SCH MG: 2.5 SOLUTION RESPIRATORY (INHALATION) at 14:25

## 2020-06-12 RX ADMIN — SODIUM CHLORIDE SCH ML: 9 INJECTION INTRAMUSCULAR; INTRAVENOUS; SUBCUTANEOUS at 13:37

## 2020-06-12 RX ADMIN — ALBUTEROL SULFATE SCH MG: 2.5 SOLUTION RESPIRATORY (INHALATION) at 02:06

## 2020-06-12 RX ADMIN — ALBUTEROL SULFATE SCH MG: 2.5 SOLUTION RESPIRATORY (INHALATION) at 18:57

## 2020-06-12 RX ADMIN — ACETYLCYSTEINE SCH MG: 200 INHALANT RESPIRATORY (INHALATION) at 22:03

## 2020-06-12 RX ADMIN — IMMUNE GLOBULIN (HUMAN) SCH GRAMS: 10 INJECTION INTRAVENOUS; SUBCUTANEOUS at 10:56

## 2020-06-12 RX ADMIN — PANTOPRAZOLE SODIUM SCH MG: 40 INJECTION, POWDER, FOR SOLUTION INTRAVENOUS at 09:14

## 2020-06-12 RX ADMIN — ACETYLCYSTEINE SCH MG: 200 INHALANT RESPIRATORY (INHALATION) at 07:37

## 2020-06-12 RX ADMIN — SODIUM CHLORIDE SCH ML: 9 INJECTION INTRAMUSCULAR; INTRAVENOUS; SUBCUTANEOUS at 22:04

## 2020-06-12 RX ADMIN — SODIUM CHLORIDE SCH ML: 9 INJECTION INTRAMUSCULAR; INTRAVENOUS; SUBCUTANEOUS at 06:37

## 2020-06-12 RX ADMIN — PYRIDOSTIGMINE BROMIDE SCH MG: 60 TABLET ORAL at 09:15

## 2020-06-12 RX ADMIN — IPRATROPIUM BROMIDE SCH MG: 0.5 SOLUTION RESPIRATORY (INHALATION) at 18:57

## 2020-06-12 NOTE — NUR
open note

assumed care of pt. upon entering room pt eyes closed, breahting even and unlabored on 2L 
NC. no s/s distress noted. bed locked, low and 2x rails up. call light in reach. this nurse 
to update pt on plan of care at later time. this nurse to round q1hr and prn.

## 2020-06-12 NOTE — NUR
paged Dr Quinones to update on pt condition. MD orders that pt remain on [facility] status and will 
reevaluate on a daily basis.

## 2020-06-13 VITALS — SYSTOLIC BLOOD PRESSURE: 107 MMHG | DIASTOLIC BLOOD PRESSURE: 69 MMHG

## 2020-06-13 VITALS — DIASTOLIC BLOOD PRESSURE: 85 MMHG | SYSTOLIC BLOOD PRESSURE: 134 MMHG

## 2020-06-13 VITALS — SYSTOLIC BLOOD PRESSURE: 115 MMHG | DIASTOLIC BLOOD PRESSURE: 80 MMHG

## 2020-06-13 VITALS — DIASTOLIC BLOOD PRESSURE: 61 MMHG | SYSTOLIC BLOOD PRESSURE: 101 MMHG

## 2020-06-13 VITALS — SYSTOLIC BLOOD PRESSURE: 100 MMHG | DIASTOLIC BLOOD PRESSURE: 64 MMHG

## 2020-06-13 LAB
ALBUMIN SERPL-MCNC: 3 G/DL (ref 3.4–5)
ALP SERPL-CCNC: 79 U/L (ref 45–117)
ALT SERPL-CCNC: 37 U/L (ref 16–61)
ANION GAP SERPL CALCULATED.3IONS-SCNC: 6 MMOL/L (ref 5–15)
BILIRUB SERPL-MCNC: 0.4 MG/DL (ref 0.2–1)
BUN SERPL-MCNC: 12 MG/DL (ref 7–18)
BUN/CREAT SERPL: 10.2
CALCIUM SERPL-MCNC: 9 MG/DL (ref 8.5–10.1)
CHLORIDE SERPL-SCNC: 99 MMOL/L (ref 98–107)
CO2 SERPL-SCNC: 28 MMOL/L (ref 21–32)
GLUCOSE SERPL-MCNC: 97 MG/DL (ref 74–106)
HCT VFR BLD AUTO: 43.7 % (ref 41–53)
HGB BLD-MCNC: 14.4 G/DL (ref 13.5–17.5)
MCH RBC QN AUTO: 30.4 PG (ref 28–32)
MCV RBC AUTO: 91.9 FL (ref 80–100)
NRBC BLD QL AUTO: 0 %
POTASSIUM SERPL-SCNC: 4.1 MMOL/L (ref 3.5–5.1)
PROT SERPL-MCNC: 7.7 G/DL (ref 6.4–8.2)
SODIUM SERPL-SCNC: 133 MMOL/L (ref 136–145)

## 2020-06-13 RX ADMIN — ALBUTEROL SULFATE SCH MG: 2.5 SOLUTION RESPIRATORY (INHALATION) at 19:50

## 2020-06-13 RX ADMIN — IMMUNE GLOBULIN (HUMAN) SCH GRAMS: 10 INJECTION INTRAVENOUS; SUBCUTANEOUS at 10:18

## 2020-06-13 RX ADMIN — PYRIDOSTIGMINE BROMIDE SCH MG: 60 TABLET ORAL at 22:34

## 2020-06-13 RX ADMIN — PYRIDOSTIGMINE BROMIDE SCH MG: 60 TABLET ORAL at 06:28

## 2020-06-13 RX ADMIN — ALBUTEROL SULFATE SCH MG: 2.5 SOLUTION RESPIRATORY (INHALATION) at 14:47

## 2020-06-13 RX ADMIN — ALBUTEROL SULFATE SCH MG: 2.5 SOLUTION RESPIRATORY (INHALATION) at 10:41

## 2020-06-13 RX ADMIN — IPRATROPIUM BROMIDE SCH MG: 0.5 SOLUTION RESPIRATORY (INHALATION) at 22:35

## 2020-06-13 RX ADMIN — BUDESONIDE SCH MG: 0.5 SUSPENSION RESPIRATORY (INHALATION) at 22:35

## 2020-06-13 RX ADMIN — SODIUM CHLORIDE SCH ML: 9 INJECTION INTRAMUSCULAR; INTRAVENOUS; SUBCUTANEOUS at 12:18

## 2020-06-13 RX ADMIN — IPRATROPIUM BROMIDE SCH MG: 0.5 SOLUTION RESPIRATORY (INHALATION) at 19:50

## 2020-06-13 RX ADMIN — ALBUTEROL SULFATE SCH MG: 2.5 SOLUTION RESPIRATORY (INHALATION) at 22:35

## 2020-06-13 RX ADMIN — IPRATROPIUM BROMIDE SCH MG: 0.5 SOLUTION RESPIRATORY (INHALATION) at 06:30

## 2020-06-13 RX ADMIN — IPRATROPIUM BROMIDE SCH MG: 0.5 SOLUTION RESPIRATORY (INHALATION) at 02:00

## 2020-06-13 RX ADMIN — SODIUM CHLORIDE SCH ML: 9 INJECTION INTRAMUSCULAR; INTRAVENOUS; SUBCUTANEOUS at 06:28

## 2020-06-13 RX ADMIN — ALBUTEROL SULFATE SCH MG: 2.5 SOLUTION RESPIRATORY (INHALATION) at 02:00

## 2020-06-13 RX ADMIN — ATORVASTATIN CALCIUM SCH MG: 20 TABLET, FILM COATED ORAL at 22:35

## 2020-06-13 RX ADMIN — ENOXAPARIN SODIUM SCH MG: 40 INJECTION SUBCUTANEOUS at 10:18

## 2020-06-13 RX ADMIN — IPRATROPIUM BROMIDE SCH MG: 0.5 SOLUTION RESPIRATORY (INHALATION) at 10:40

## 2020-06-13 RX ADMIN — IPRATROPIUM BROMIDE SCH MG: 0.5 SOLUTION RESPIRATORY (INHALATION) at 14:47

## 2020-06-13 RX ADMIN — ALBUTEROL SULFATE SCH MG: 2.5 SOLUTION RESPIRATORY (INHALATION) at 06:30

## 2020-06-13 RX ADMIN — SODIUM CHLORIDE SCH ML: 9 INJECTION INTRAMUSCULAR; INTRAVENOUS; SUBCUTANEOUS at 22:34

## 2020-06-13 RX ADMIN — PYRIDOSTIGMINE BROMIDE SCH MG: 60 TABLET ORAL at 18:05

## 2020-06-13 RX ADMIN — PANTOPRAZOLE SODIUM SCH MG: 40 INJECTION, POWDER, FOR SOLUTION INTRAVENOUS at 10:18

## 2020-06-13 RX ADMIN — BUDESONIDE SCH MG: 0.5 SUSPENSION RESPIRATORY (INHALATION) at 06:29

## 2020-06-13 RX ADMIN — PYRIDOSTIGMINE BROMIDE SCH MG: 60 TABLET ORAL at 12:17

## 2020-06-13 RX ADMIN — PANTOPRAZOLE SODIUM SCH MG: 40 INJECTION, POWDER, FOR SOLUTION INTRAVENOUS at 22:34

## 2020-06-13 NOTE — NUR
SWALLOW RE-EVALUATED. PATIENT ABLE TO TOLERATE REGULAR DIET TEXTURE WITH THIN LIQUIDS WITH 
NO OVERT SIGNS OR SYMPTOMS OF ASPIRATION. NURSING NOTIFIED.

## 2020-06-13 NOTE — NUR
DR. VALENCIA CONTACTED AND MADE AWARE OF DR. ISABEL REQUEST FOR CARDIOTHORACIC CONSULT 
AND INITIATION OF HYPOTHYROIDISM MEDICATIONS.

## 2020-06-13 NOTE — NUR
opening note



pt A&Ox4.  respirations even nonlabored 2Lnc.  pt is inmate, 2 [employer] guards at bedside.  
left wrist chained to bed.  POC discussed.  Bed in low locked position, call light within 
reach.

## 2020-06-14 VITALS — DIASTOLIC BLOOD PRESSURE: 63 MMHG | SYSTOLIC BLOOD PRESSURE: 111 MMHG

## 2020-06-14 VITALS — DIASTOLIC BLOOD PRESSURE: 69 MMHG | SYSTOLIC BLOOD PRESSURE: 102 MMHG

## 2020-06-14 VITALS — SYSTOLIC BLOOD PRESSURE: 116 MMHG | DIASTOLIC BLOOD PRESSURE: 69 MMHG

## 2020-06-14 RX ADMIN — IPRATROPIUM BROMIDE SCH MG: 0.5 SOLUTION RESPIRATORY (INHALATION) at 14:35

## 2020-06-14 RX ADMIN — ALBUTEROL SULFATE SCH MG: 2.5 SOLUTION RESPIRATORY (INHALATION) at 07:27

## 2020-06-14 RX ADMIN — PANTOPRAZOLE SODIUM SCH MG: 40 INJECTION, POWDER, FOR SOLUTION INTRAVENOUS at 10:04

## 2020-06-14 RX ADMIN — PYRIDOSTIGMINE BROMIDE SCH MG: 60 TABLET ORAL at 16:49

## 2020-06-14 RX ADMIN — BUDESONIDE SCH MG: 0.5 SUSPENSION RESPIRATORY (INHALATION) at 10:56

## 2020-06-14 RX ADMIN — PYRIDOSTIGMINE BROMIDE SCH MG: 60 TABLET ORAL at 06:37

## 2020-06-14 RX ADMIN — BUDESONIDE SCH MG: 0.5 SUSPENSION RESPIRATORY (INHALATION) at 22:13

## 2020-06-14 RX ADMIN — ALBUTEROL SULFATE SCH MG: 2.5 SOLUTION RESPIRATORY (INHALATION) at 10:57

## 2020-06-14 RX ADMIN — ALBUTEROL SULFATE SCH MG: 2.5 SOLUTION RESPIRATORY (INHALATION) at 14:35

## 2020-06-14 RX ADMIN — ENOXAPARIN SODIUM SCH MG: 40 INJECTION SUBCUTANEOUS at 10:04

## 2020-06-14 RX ADMIN — PYRIDOSTIGMINE BROMIDE SCH MG: 60 TABLET ORAL at 21:50

## 2020-06-14 RX ADMIN — PANTOPRAZOLE SODIUM SCH MG: 40 INJECTION, POWDER, FOR SOLUTION INTRAVENOUS at 21:50

## 2020-06-14 RX ADMIN — IPRATROPIUM BROMIDE SCH MG: 0.5 SOLUTION RESPIRATORY (INHALATION) at 18:45

## 2020-06-14 RX ADMIN — PYRIDOSTIGMINE BROMIDE SCH MG: 60 TABLET ORAL at 12:46

## 2020-06-14 RX ADMIN — SODIUM CHLORIDE SCH ML: 9 INJECTION INTRAMUSCULAR; INTRAVENOUS; SUBCUTANEOUS at 21:50

## 2020-06-14 RX ADMIN — IPRATROPIUM BROMIDE SCH MG: 0.5 SOLUTION RESPIRATORY (INHALATION) at 07:27

## 2020-06-14 RX ADMIN — SODIUM CHLORIDE SCH ML: 9 INJECTION INTRAMUSCULAR; INTRAVENOUS; SUBCUTANEOUS at 06:37

## 2020-06-14 RX ADMIN — ALBUTEROL SULFATE SCH MG: 2.5 SOLUTION RESPIRATORY (INHALATION) at 22:13

## 2020-06-14 RX ADMIN — LEVOTHYROXINE SODIUM SCH MCG: 100 TABLET ORAL at 06:37

## 2020-06-14 RX ADMIN — ALBUTEROL SULFATE SCH MG: 2.5 SOLUTION RESPIRATORY (INHALATION) at 18:46

## 2020-06-14 RX ADMIN — ATORVASTATIN CALCIUM SCH MG: 20 TABLET, FILM COATED ORAL at 21:50

## 2020-06-14 RX ADMIN — IPRATROPIUM BROMIDE SCH MG: 0.5 SOLUTION RESPIRATORY (INHALATION) at 10:57

## 2020-06-14 RX ADMIN — ALBUTEROL SULFATE SCH MG: 2.5 SOLUTION RESPIRATORY (INHALATION) at 02:25

## 2020-06-14 RX ADMIN — IMMUNE GLOBULIN (HUMAN) SCH GRAMS: 10 INJECTION INTRAVENOUS; SUBCUTANEOUS at 10:05

## 2020-06-14 RX ADMIN — SODIUM CHLORIDE SCH ML: 9 INJECTION INTRAMUSCULAR; INTRAVENOUS; SUBCUTANEOUS at 12:46

## 2020-06-14 RX ADMIN — IPRATROPIUM BROMIDE SCH MG: 0.5 SOLUTION RESPIRATORY (INHALATION) at 02:25

## 2020-06-14 RX ADMIN — IPRATROPIUM BROMIDE SCH MG: 0.5 SOLUTION RESPIRATORY (INHALATION) at 22:13

## 2020-06-14 NOTE — NUR
IV INFILTRATION TO LAC#20, IV DC'D, CATHETER INTACT. NO PHLEBITIS. EXTREMITY ELEVATED. 

IV INSERTION TO RIGHT HAND #20, FLUSHES WELL. PT TOLERATED PROCEDURE WELL. 

BED LOCKED AND IN LOWEST POSITION, CALL LIGHT WITHIN REACH. WILL CONTINUE TO MONITOR.

## 2020-06-14 NOTE — NUR
RT NOTE: 

PT STATED HE DID NOT FEEL HE NEEDED CPT AT THIS TIME. NO SIGNS OF RESPIRATORY DISTRESS 
NOTED. PRE AND POST NIF WERE BOTH GREATER THAN -60. GUARDS AT BEDSIDE. PT AWARE I WILL 
RETURN FOR NEXT TX. WILL CONTINUE TO MONITOR.

## 2020-06-14 NOTE — NUR
RT NOTE:

PRE AND POST NIF WERE BOTH >-60. NO SIGNS OF RESPIRATORY DISTRESS NOTED. GUARDS AT BEDSIDE. 
WILL CONTINUE TO MONITOR.

## 2020-06-14 NOTE — NUR
opening note



pt A&Ox4.  respirations even and nonlabored on 3Lnc.  no pain or discomfort at this time.  
POC discussed.  bed in low locked position, call light within reach.

## 2020-06-14 NOTE — NUR
RT NOTE: 

PT REFUSED CPT AT THIS TIME. NO SIGNS OF RESPIRATORY DISTRESS NOTED. TX GIVEN WITH EZ-PAP. 
PRE NIF -46 POST NIF 50. WILL CONTINUE TO MONITOR.

## 2020-06-15 VITALS — SYSTOLIC BLOOD PRESSURE: 109 MMHG | DIASTOLIC BLOOD PRESSURE: 67 MMHG

## 2020-06-15 VITALS — DIASTOLIC BLOOD PRESSURE: 70 MMHG | SYSTOLIC BLOOD PRESSURE: 106 MMHG

## 2020-06-15 VITALS — DIASTOLIC BLOOD PRESSURE: 68 MMHG | SYSTOLIC BLOOD PRESSURE: 115 MMHG

## 2020-06-15 VITALS — SYSTOLIC BLOOD PRESSURE: 106 MMHG | DIASTOLIC BLOOD PRESSURE: 73 MMHG

## 2020-06-15 VITALS — SYSTOLIC BLOOD PRESSURE: 97 MMHG | DIASTOLIC BLOOD PRESSURE: 47 MMHG

## 2020-06-15 VITALS — SYSTOLIC BLOOD PRESSURE: 106 MMHG | DIASTOLIC BLOOD PRESSURE: 70 MMHG

## 2020-06-15 RX ADMIN — PYRIDOSTIGMINE BROMIDE SCH MG: 60 TABLET ORAL at 12:59

## 2020-06-15 RX ADMIN — ALBUTEROL SULFATE SCH MG: 2.5 SOLUTION RESPIRATORY (INHALATION) at 22:05

## 2020-06-15 RX ADMIN — IPRATROPIUM BROMIDE SCH MG: 0.5 SOLUTION RESPIRATORY (INHALATION) at 07:01

## 2020-06-15 RX ADMIN — IPRATROPIUM BROMIDE SCH MG: 0.5 SOLUTION RESPIRATORY (INHALATION) at 02:11

## 2020-06-15 RX ADMIN — SODIUM CHLORIDE SCH ML: 9 INJECTION INTRAMUSCULAR; INTRAVENOUS; SUBCUTANEOUS at 06:20

## 2020-06-15 RX ADMIN — IPRATROPIUM BROMIDE SCH MG: 0.5 SOLUTION RESPIRATORY (INHALATION) at 22:05

## 2020-06-15 RX ADMIN — PYRIDOSTIGMINE BROMIDE SCH MG: 60 TABLET ORAL at 17:47

## 2020-06-15 RX ADMIN — SODIUM CHLORIDE SCH ML: 9 INJECTION INTRAMUSCULAR; INTRAVENOUS; SUBCUTANEOUS at 21:52

## 2020-06-15 RX ADMIN — BUDESONIDE SCH MG: 0.5 SUSPENSION RESPIRATORY (INHALATION) at 10:31

## 2020-06-15 RX ADMIN — ALBUTEROL SULFATE SCH MG: 2.5 SOLUTION RESPIRATORY (INHALATION) at 10:31

## 2020-06-15 RX ADMIN — ALBUTEROL SULFATE SCH MG: 2.5 SOLUTION RESPIRATORY (INHALATION) at 07:02

## 2020-06-15 RX ADMIN — IPRATROPIUM BROMIDE SCH MG: 0.5 SOLUTION RESPIRATORY (INHALATION) at 19:01

## 2020-06-15 RX ADMIN — IPRATROPIUM BROMIDE SCH MG: 0.5 SOLUTION RESPIRATORY (INHALATION) at 10:31

## 2020-06-15 RX ADMIN — ALBUTEROL SULFATE SCH MG: 2.5 SOLUTION RESPIRATORY (INHALATION) at 02:12

## 2020-06-15 RX ADMIN — SODIUM CHLORIDE SCH ML: 9 INJECTION INTRAMUSCULAR; INTRAVENOUS; SUBCUTANEOUS at 15:42

## 2020-06-15 RX ADMIN — LEVOTHYROXINE SODIUM SCH MCG: 100 TABLET ORAL at 06:21

## 2020-06-15 RX ADMIN — ALBUTEROL SULFATE SCH MG: 2.5 SOLUTION RESPIRATORY (INHALATION) at 14:33

## 2020-06-15 RX ADMIN — PANTOPRAZOLE SODIUM SCH MG: 40 INJECTION, POWDER, FOR SOLUTION INTRAVENOUS at 21:52

## 2020-06-15 RX ADMIN — IMMUNE GLOBULIN (HUMAN) SCH GRAMS: 10 INJECTION INTRAVENOUS; SUBCUTANEOUS at 11:40

## 2020-06-15 RX ADMIN — PYRIDOSTIGMINE BROMIDE SCH MG: 60 TABLET ORAL at 06:20

## 2020-06-15 RX ADMIN — ENOXAPARIN SODIUM SCH MG: 40 INJECTION SUBCUTANEOUS at 10:51

## 2020-06-15 RX ADMIN — PYRIDOSTIGMINE BROMIDE SCH MG: 60 TABLET ORAL at 22:24

## 2020-06-15 RX ADMIN — ALBUTEROL SULFATE SCH MG: 2.5 SOLUTION RESPIRATORY (INHALATION) at 19:01

## 2020-06-15 RX ADMIN — BUDESONIDE SCH MG: 0.5 SUSPENSION RESPIRATORY (INHALATION) at 19:01

## 2020-06-15 RX ADMIN — IPRATROPIUM BROMIDE SCH MG: 0.5 SOLUTION RESPIRATORY (INHALATION) at 14:33

## 2020-06-15 RX ADMIN — ATORVASTATIN CALCIUM SCH MG: 20 TABLET, FILM COATED ORAL at 21:52

## 2020-06-15 RX ADMIN — PANTOPRAZOLE SODIUM SCH MG: 40 INJECTION, POWDER, FOR SOLUTION INTRAVENOUS at 10:51

## 2020-06-15 NOTE — NUR
Opening Shift Note

Received report from Yasmine MÁRQUEZ. Assumed care of patient, awake and alert.  at 
bedside. No S/S of distress/SOB or pain.  Instructed on POC and to call for assist PRN, will 
continue to monitor for changes Q1hr and PRN.

## 2020-06-15 NOTE — NUR
RT NOTE: PRE AND POST BRONCHODILATOR NIF DONE WITH >-60 ON BOTH ATTEMPTS. CPT PERFORMED 
WITHOUT ISSUE.

## 2020-06-15 NOTE — NUR
Nutrition Followup Notes



WT: 114.8 kg



Pt was sleeping when rounded this morning.  Pt appetite is improved and good aeb pt PO 
intake of 100% x3 meals per RN nutrition doc. Pt with no noted distress per RN doc.  Will 
continue to closely monitor pertinent labs, PO intake and skin status prn. Will followup in 
3-5 days



Est Energy needs: 1392-5798 kcals (14-18 kcal/kgBW), Est Protein needs: 73-87g (1-1.2g/kg 
IBW 72.7kg). Will continue to monitor and reassess prn.



LABS: Na 133 L, Alb 3.0 L



GI: Last BM noted on 6/12 per RN doc.



BS: 20 low risk, Please refer to wound assessment report for full details.



PES: Partially resolved, pt with adequate po intake x 3 days 1) Increased nutrient needs  
aeb pt with dysphagia, pending swallow eval r/t pt with no PO intake

2) Obesity aeb 152% IBW and BMI of 36.0 kg/m2  r/t energy intake in excess of energy needs





Comments 

Will continue to closely monitor pertinent labs, PO intake and skin status prn. Will 
followup in 3-5 days

     

1) continue assistance with meals. 2) consider ensure Enlive 1 carton bid if PO is low. 3) 
consider alternate nutrition support if GI is not accessible. 4) Continue current plan of 
care

## 2020-06-15 NOTE — NUR
rounds



pt resting in semi fowlers with eyes closed.  no s/s of pain or discomfort.  will continue 
to monitor.

## 2020-06-15 NOTE — NUR
RT NOTE: SPOKE WITH MD LINO, RECEIVED ORDER TO D/C NIF AND CPT. PT ON 2LPM NC SPO2 97% BS 
CLEAR AND DECREASED WITH NO COMPLAINTS OF SOB. WILL CONTINUE EZPAP WITH MED NEB TX Q4.

## 2020-06-15 NOTE — NUR
rounds



pt is awake and alert, watching tv.  pt denies pain or discomfort at this time.  will 
continue to monitor.

## 2020-06-15 NOTE — NUR
closing note



pt sitting in bedside chair watching tv.  no complaints of pain or discomfort.  endorsed 
care to WILLI Underwood.

## 2020-06-15 NOTE — NUR
Received pt resting in bed, call light within reach, guards at bed side, pt denies any pain 
or discomfort at this time, pt reports feeling better, pt reports been able to eat regular 
food well, and able to get out of bed to chair with minimal assistance, will continue to 
monitor pt.

## 2020-06-15 NOTE — NUR
Dr. Quinones / neuro at bed side to see pt, doctor discussed the plan of care with pt, as 
per doctor ok to downgrade pt to telemetry, will continue to monitor pt.

## 2020-06-15 NOTE — NUR
RT NOTE: SAME RESULTS. PRE AND POST BRONCHODILATOR NIF DONE WITH >-60 ON BOTH ATTEMPTS. CPT 
PERFORMED WITHOUT ISSUE.

## 2020-06-16 VITALS — DIASTOLIC BLOOD PRESSURE: 62 MMHG | SYSTOLIC BLOOD PRESSURE: 99 MMHG

## 2020-06-16 VITALS — DIASTOLIC BLOOD PRESSURE: 68 MMHG | SYSTOLIC BLOOD PRESSURE: 103 MMHG

## 2020-06-16 VITALS — DIASTOLIC BLOOD PRESSURE: 83 MMHG | SYSTOLIC BLOOD PRESSURE: 128 MMHG

## 2020-06-16 VITALS — DIASTOLIC BLOOD PRESSURE: 74 MMHG | SYSTOLIC BLOOD PRESSURE: 110 MMHG

## 2020-06-16 VITALS — DIASTOLIC BLOOD PRESSURE: 63 MMHG | SYSTOLIC BLOOD PRESSURE: 110 MMHG

## 2020-06-16 VITALS — SYSTOLIC BLOOD PRESSURE: 110 MMHG | DIASTOLIC BLOOD PRESSURE: 74 MMHG

## 2020-06-16 RX ADMIN — ALBUTEROL SULFATE SCH MG: 2.5 SOLUTION RESPIRATORY (INHALATION) at 10:49

## 2020-06-16 RX ADMIN — SODIUM CHLORIDE SCH ML: 9 INJECTION INTRAMUSCULAR; INTRAVENOUS; SUBCUTANEOUS at 14:32

## 2020-06-16 RX ADMIN — ENOXAPARIN SODIUM SCH MG: 40 INJECTION SUBCUTANEOUS at 10:31

## 2020-06-16 RX ADMIN — IPRATROPIUM BROMIDE SCH MG: 0.5 SOLUTION RESPIRATORY (INHALATION) at 19:36

## 2020-06-16 RX ADMIN — PYRIDOSTIGMINE BROMIDE SCH MG: 60 TABLET ORAL at 21:55

## 2020-06-16 RX ADMIN — PYRIDOSTIGMINE BROMIDE SCH MG: 60 TABLET ORAL at 11:47

## 2020-06-16 RX ADMIN — PYRIDOSTIGMINE BROMIDE SCH MG: 60 TABLET ORAL at 05:57

## 2020-06-16 RX ADMIN — PANTOPRAZOLE SODIUM SCH MG: 40 INJECTION, POWDER, FOR SOLUTION INTRAVENOUS at 10:31

## 2020-06-16 RX ADMIN — ALBUTEROL SULFATE SCH MG: 2.5 SOLUTION RESPIRATORY (INHALATION) at 14:38

## 2020-06-16 RX ADMIN — ATORVASTATIN CALCIUM SCH MG: 20 TABLET, FILM COATED ORAL at 21:54

## 2020-06-16 RX ADMIN — IPRATROPIUM BROMIDE SCH MG: 0.5 SOLUTION RESPIRATORY (INHALATION) at 06:39

## 2020-06-16 RX ADMIN — IPRATROPIUM BROMIDE SCH MG: 0.5 SOLUTION RESPIRATORY (INHALATION) at 14:38

## 2020-06-16 RX ADMIN — ALBUTEROL SULFATE SCH MG: 2.5 SOLUTION RESPIRATORY (INHALATION) at 19:36

## 2020-06-16 RX ADMIN — ALBUTEROL SULFATE SCH MG: 2.5 SOLUTION RESPIRATORY (INHALATION) at 02:22

## 2020-06-16 RX ADMIN — SODIUM CHLORIDE SCH ML: 9 INJECTION INTRAMUSCULAR; INTRAVENOUS; SUBCUTANEOUS at 21:55

## 2020-06-16 RX ADMIN — ALBUTEROL SULFATE SCH MG: 2.5 SOLUTION RESPIRATORY (INHALATION) at 06:39

## 2020-06-16 RX ADMIN — PANTOPRAZOLE SODIUM SCH MG: 40 INJECTION, POWDER, FOR SOLUTION INTRAVENOUS at 21:54

## 2020-06-16 RX ADMIN — PYRIDOSTIGMINE BROMIDE SCH MG: 60 TABLET ORAL at 17:49

## 2020-06-16 RX ADMIN — LEVOTHYROXINE SODIUM SCH MCG: 100 TABLET ORAL at 06:48

## 2020-06-16 RX ADMIN — IPRATROPIUM BROMIDE SCH MG: 0.5 SOLUTION RESPIRATORY (INHALATION) at 02:22

## 2020-06-16 RX ADMIN — SODIUM CHLORIDE SCH ML: 9 INJECTION INTRAMUSCULAR; INTRAVENOUS; SUBCUTANEOUS at 05:57

## 2020-06-16 RX ADMIN — IPRATROPIUM BROMIDE SCH MG: 0.5 SOLUTION RESPIRATORY (INHALATION) at 10:49

## 2020-06-16 RX ADMIN — BUDESONIDE SCH MG: 0.5 SUSPENSION RESPIRATORY (INHALATION) at 10:49

## 2020-06-16 NOTE — NUR
MRI

PATIENT RETURNED TO ROOM FROM MRI TEST. PATIENT AMBULATED FROM WHEELCHAIR TO BED. NO 
DISTRESS NOTED ON BED TRANSFER. PATIENT IV WAS CONNECTED TO PUMP TO CONTINUE WITH IV MED 
INFUSION.

## 2020-06-16 NOTE — NUR
MRI

PATIENT WAS WHEELED DOWN FOR BRAIN MRI WITHOUT CONTRAST. MRI ORDERED BY DR. ISABEL FOR 
DIPLOPIA.

## 2020-06-16 NOTE — NUR
Doctor

Dr. SAVANNAH [place] at bedside to see patient. Doctor informed that cardiothoracic surgeon will come 
and see patient today. Doctor discussed plan of care with patient.

## 2020-06-17 VITALS — DIASTOLIC BLOOD PRESSURE: 70 MMHG | SYSTOLIC BLOOD PRESSURE: 127 MMHG

## 2020-06-17 VITALS — SYSTOLIC BLOOD PRESSURE: 87 MMHG | DIASTOLIC BLOOD PRESSURE: 59 MMHG

## 2020-06-17 VITALS — SYSTOLIC BLOOD PRESSURE: 103 MMHG | DIASTOLIC BLOOD PRESSURE: 65 MMHG

## 2020-06-17 VITALS — DIASTOLIC BLOOD PRESSURE: 78 MMHG | SYSTOLIC BLOOD PRESSURE: 133 MMHG

## 2020-06-17 VITALS — SYSTOLIC BLOOD PRESSURE: 135 MMHG | DIASTOLIC BLOOD PRESSURE: 87 MMHG

## 2020-06-17 VITALS — DIASTOLIC BLOOD PRESSURE: 74 MMHG | SYSTOLIC BLOOD PRESSURE: 115 MMHG

## 2020-06-17 RX ADMIN — ENOXAPARIN SODIUM SCH MG: 40 INJECTION SUBCUTANEOUS at 09:18

## 2020-06-17 RX ADMIN — IPRATROPIUM BROMIDE SCH MG: 0.5 SOLUTION RESPIRATORY (INHALATION) at 00:12

## 2020-06-17 RX ADMIN — PANTOPRAZOLE SODIUM SCH MG: 40 INJECTION, POWDER, FOR SOLUTION INTRAVENOUS at 21:17

## 2020-06-17 RX ADMIN — ALBUTEROL SULFATE SCH MG: 2.5 SOLUTION RESPIRATORY (INHALATION) at 10:20

## 2020-06-17 RX ADMIN — HYDROCODONE BITARTRATE AND ACETAMINOPHEN PRN TAB: 5; 325 TABLET ORAL at 09:28

## 2020-06-17 RX ADMIN — IPRATROPIUM BROMIDE SCH MG: 0.5 SOLUTION RESPIRATORY (INHALATION) at 14:56

## 2020-06-17 RX ADMIN — ALBUTEROL SULFATE SCH MG: 2.5 SOLUTION RESPIRATORY (INHALATION) at 04:27

## 2020-06-17 RX ADMIN — SODIUM CHLORIDE SCH ML: 9 INJECTION INTRAMUSCULAR; INTRAVENOUS; SUBCUTANEOUS at 06:00

## 2020-06-17 RX ADMIN — BUDESONIDE SCH MG: 0.5 SUSPENSION RESPIRATORY (INHALATION) at 22:41

## 2020-06-17 RX ADMIN — PYRIDOSTIGMINE BROMIDE SCH MG: 60 TABLET ORAL at 17:57

## 2020-06-17 RX ADMIN — IPRATROPIUM BROMIDE SCH MG: 0.5 SOLUTION RESPIRATORY (INHALATION) at 10:20

## 2020-06-17 RX ADMIN — IPRATROPIUM BROMIDE SCH MG: 0.5 SOLUTION RESPIRATORY (INHALATION) at 04:27

## 2020-06-17 RX ADMIN — BUDESONIDE SCH MG: 0.5 SUSPENSION RESPIRATORY (INHALATION) at 00:12

## 2020-06-17 RX ADMIN — SODIUM CHLORIDE SCH ML: 9 INJECTION INTRAMUSCULAR; INTRAVENOUS; SUBCUTANEOUS at 16:57

## 2020-06-17 RX ADMIN — PYRIDOSTIGMINE BROMIDE SCH MG: 60 TABLET ORAL at 06:16

## 2020-06-17 RX ADMIN — BUDESONIDE SCH MG: 0.5 SUSPENSION RESPIRATORY (INHALATION) at 07:05

## 2020-06-17 RX ADMIN — ALBUTEROL SULFATE SCH MG: 2.5 SOLUTION RESPIRATORY (INHALATION) at 07:04

## 2020-06-17 RX ADMIN — PYRIDOSTIGMINE BROMIDE SCH MG: 60 TABLET ORAL at 21:17

## 2020-06-17 RX ADMIN — ALBUTEROL SULFATE SCH MG: 2.5 SOLUTION RESPIRATORY (INHALATION) at 00:12

## 2020-06-17 RX ADMIN — HYDROCODONE BITARTRATE AND ACETAMINOPHEN PRN TAB: 5; 325 TABLET ORAL at 01:00

## 2020-06-17 RX ADMIN — SODIUM CHLORIDE SCH ML: 9 INJECTION INTRAMUSCULAR; INTRAVENOUS; SUBCUTANEOUS at 21:17

## 2020-06-17 RX ADMIN — PYRIDOSTIGMINE BROMIDE SCH MG: 60 TABLET ORAL at 12:26

## 2020-06-17 RX ADMIN — LEVOTHYROXINE SODIUM SCH MCG: 100 TABLET ORAL at 06:16

## 2020-06-17 RX ADMIN — ALBUTEROL SULFATE SCH MG: 2.5 SOLUTION RESPIRATORY (INHALATION) at 18:38

## 2020-06-17 RX ADMIN — IPRATROPIUM BROMIDE SCH MG: 0.5 SOLUTION RESPIRATORY (INHALATION) at 22:41

## 2020-06-17 RX ADMIN — ALBUTEROL SULFATE SCH MG: 2.5 SOLUTION RESPIRATORY (INHALATION) at 22:41

## 2020-06-17 RX ADMIN — ATORVASTATIN CALCIUM SCH MG: 20 TABLET, FILM COATED ORAL at 21:17

## 2020-06-17 RX ADMIN — PANTOPRAZOLE SODIUM SCH MG: 40 INJECTION, POWDER, FOR SOLUTION INTRAVENOUS at 09:18

## 2020-06-17 RX ADMIN — ALBUTEROL SULFATE SCH MG: 2.5 SOLUTION RESPIRATORY (INHALATION) at 15:06

## 2020-06-17 RX ADMIN — IPRATROPIUM BROMIDE SCH MG: 0.5 SOLUTION RESPIRATORY (INHALATION) at 18:38

## 2020-06-17 RX ADMIN — IPRATROPIUM BROMIDE SCH MG: 0.5 SOLUTION RESPIRATORY (INHALATION) at 07:04

## 2020-06-17 NOTE — NUR
Pain

Patient complains of headache 6/10 and is requesting NORCO. Will medicate per orders. Will 
continue to monitor Q1 hour and PRN. Guards at bedside.

## 2020-06-17 NOTE — NUR
Opening Shift Note

Assumed care of patient, awake and alert, oriented x 4, clear speech, follows direction. On 
oxygen at 2L via NC with even and unlabored respirations, no S/S of distress/SOB. Patient 
reports tolerating dinner with no problems. patient denies difficulty swallowing. patient 
turns independently in bed. Instructed on POC and to call for assist PRN, will continue to 
monitor for changes Q1hr and PRN.

## 2020-06-17 NOTE — NUR
Cardiothoracic consult

patient not seen by cardiothoracic doctor. MD Sue no longer available. Charge nurse 
Radha weston, states director Gwen will follow up tomorrow.

## 2020-06-17 NOTE — NUR
Closing Note

Report given to night shift RN. No signs or symptoms of distress noted at this time. Guards 
at bedside

## 2020-06-17 NOTE — NUR
Patient complaints of headache, no pain medication listed. Informed Dr. OSCAR Birmingham, awaiting 
for call back.

## 2020-06-17 NOTE — NUR
Respiratory note:

 AT BEDSIDE FOR MED [facility] TX. PT COMMUNICATED HE HAS BEEN COUGHING UP SPUTUM HE IS COLLECTING 
ON A STYROFOAM CUP AT BEDSIDE. SECRETIONS EXPECTORATED LOOK TAN/YELLOW AND APPEAR THICK.

## 2020-06-17 NOTE — NUR
Pain reassessment

Patient denies pain at this time, and is resting comfortably in bed. Will continue to 
monitor Q1 hour and PRN.

## 2020-06-17 NOTE — NUR
Dr. SAVANNAH Birmingham at bedside

Discussing plan of care with patient and this RN. No new orders received. Will continue to 
monitor Q1 hour and PRN.

## 2020-06-17 NOTE — NUR
Opening Note

Received report from night shift RN. Patient is awake, alert and oriented. Patient is on 2L 
NC, respirations even and unlabored. Patient denies pain at this time. Reviewed plan of care 
with patient, patient verbalized understanding. Bed in low and locked position, call light 
within reach. Will continue to monitor Q1 hour and PRN. Guards at bedside.

## 2020-06-18 VITALS — DIASTOLIC BLOOD PRESSURE: 62 MMHG | SYSTOLIC BLOOD PRESSURE: 125 MMHG

## 2020-06-18 VITALS — DIASTOLIC BLOOD PRESSURE: 75 MMHG | SYSTOLIC BLOOD PRESSURE: 122 MMHG

## 2020-06-18 VITALS — DIASTOLIC BLOOD PRESSURE: 73 MMHG | SYSTOLIC BLOOD PRESSURE: 119 MMHG

## 2020-06-18 VITALS — DIASTOLIC BLOOD PRESSURE: 49 MMHG | SYSTOLIC BLOOD PRESSURE: 112 MMHG

## 2020-06-18 VITALS — SYSTOLIC BLOOD PRESSURE: 118 MMHG | DIASTOLIC BLOOD PRESSURE: 79 MMHG

## 2020-06-18 RX ADMIN — PYRIDOSTIGMINE BROMIDE SCH MG: 60 TABLET ORAL at 18:06

## 2020-06-18 RX ADMIN — PYRIDOSTIGMINE BROMIDE SCH MG: 60 TABLET ORAL at 05:59

## 2020-06-18 RX ADMIN — IPRATROPIUM BROMIDE SCH MG: 0.5 SOLUTION RESPIRATORY (INHALATION) at 02:30

## 2020-06-18 RX ADMIN — SODIUM CHLORIDE SCH ML: 9 INJECTION INTRAMUSCULAR; INTRAVENOUS; SUBCUTANEOUS at 05:59

## 2020-06-18 RX ADMIN — ALBUTEROL SULFATE SCH MG: 2.5 SOLUTION RESPIRATORY (INHALATION) at 22:02

## 2020-06-18 RX ADMIN — ALBUTEROL SULFATE SCH MG: 2.5 SOLUTION RESPIRATORY (INHALATION) at 02:30

## 2020-06-18 RX ADMIN — IPRATROPIUM BROMIDE SCH MG: 0.5 SOLUTION RESPIRATORY (INHALATION) at 14:23

## 2020-06-18 RX ADMIN — PYRIDOSTIGMINE BROMIDE SCH MG: 60 TABLET ORAL at 12:00

## 2020-06-18 RX ADMIN — ALBUTEROL SULFATE SCH MG: 2.5 SOLUTION RESPIRATORY (INHALATION) at 14:23

## 2020-06-18 RX ADMIN — ALBUTEROL SULFATE SCH MG: 2.5 SOLUTION RESPIRATORY (INHALATION) at 18:30

## 2020-06-18 RX ADMIN — LEVOTHYROXINE SODIUM SCH MCG: 100 TABLET ORAL at 05:59

## 2020-06-18 RX ADMIN — ALBUTEROL SULFATE SCH MG: 2.5 SOLUTION RESPIRATORY (INHALATION) at 06:15

## 2020-06-18 RX ADMIN — IPRATROPIUM BROMIDE SCH MG: 0.5 SOLUTION RESPIRATORY (INHALATION) at 06:15

## 2020-06-18 RX ADMIN — IPRATROPIUM BROMIDE SCH MG: 0.5 SOLUTION RESPIRATORY (INHALATION) at 18:30

## 2020-06-18 RX ADMIN — ALBUTEROL SULFATE SCH MG: 2.5 SOLUTION RESPIRATORY (INHALATION) at 10:39

## 2020-06-18 RX ADMIN — SODIUM CHLORIDE SCH ML: 9 INJECTION INTRAMUSCULAR; INTRAVENOUS; SUBCUTANEOUS at 13:40

## 2020-06-18 RX ADMIN — BUDESONIDE SCH MG: 0.5 SUSPENSION RESPIRATORY (INHALATION) at 18:30

## 2020-06-18 RX ADMIN — ENOXAPARIN SODIUM SCH MG: 40 INJECTION SUBCUTANEOUS at 09:59

## 2020-06-18 RX ADMIN — SODIUM CHLORIDE SCH ML: 9 INJECTION INTRAMUSCULAR; INTRAVENOUS; SUBCUTANEOUS at 21:30

## 2020-06-18 RX ADMIN — IPRATROPIUM BROMIDE SCH MG: 0.5 SOLUTION RESPIRATORY (INHALATION) at 10:40

## 2020-06-18 RX ADMIN — PANTOPRAZOLE SODIUM SCH MG: 40 INJECTION, POWDER, FOR SOLUTION INTRAVENOUS at 09:59

## 2020-06-18 RX ADMIN — ATORVASTATIN CALCIUM SCH MG: 20 TABLET, FILM COATED ORAL at 21:30

## 2020-06-18 RX ADMIN — BUDESONIDE SCH MG: 0.5 SUSPENSION RESPIRATORY (INHALATION) at 10:39

## 2020-06-18 RX ADMIN — IPRATROPIUM BROMIDE SCH MG: 0.5 SOLUTION RESPIRATORY (INHALATION) at 22:02

## 2020-06-18 RX ADMIN — PANTOPRAZOLE SODIUM SCH MG: 40 INJECTION, POWDER, FOR SOLUTION INTRAVENOUS at 21:30

## 2020-06-18 RX ADMIN — PYRIDOSTIGMINE BROMIDE SCH MG: 60 TABLET ORAL at 21:31

## 2020-06-18 NOTE — NUR
Closing Note

patient resting in bed with even and unlabored respirations, no s/s of distress or pain. bed 
in lowest locked position with side rails up x 2 and call light within reach.

## 2020-06-18 NOTE — NUR
IV insertion

IV access obtained, via clean sterile technique by inserting 22 gauge catheter at left 
forearm after 1 attempt(s). IV secured properly. No trauma to site. Patient tolerated well.

NOTE:

## 2020-06-18 NOTE — NUR
IV removal

IV DC'd to right hand with clean sterile technique, catheter fully intact. Pressure dressing 
applied to site. Patient tolerated well.

NOTE:

## 2020-06-18 NOTE — NUR
Opening Shift Note

Assumed care of patient, awake and alert, oriented x 4, clear speech, follows direction. On 
oxygen at 2L via NC with even and unlabored respirations, no S/S of distress/SOB. Patient 
reports tolerating breakfast with no problems. patient denies difficulty swallowing. patient 
turns independently in bed. Instructed on POC and to call for assist PRN, will continue to 
monitor for changes Q1hr and PRN.

## 2020-06-18 NOTE — NUR
MD HUBBARD RETURNED CALL

ACCORDING TO MD UGO HUBBARD HE SAW THE PATIENT ON 6/16/20 AND SPOKE WITH MD VALENCIA AND 
INDER RE: POC. MD STATES " I HAVE SEEN THE PATIENT AND WROTE A NOTE IN THE CHART WITH THE 
POC. IF THERE ARE ANY OTHER ISSUES, THAN DR VALENCIA WILL NEED TO CONTACT ME."

## 2020-06-18 NOTE — NUR
Opening Shift Note

Assumed care of patient, awake and alert, oriented x 4, clear speech, follows direction. On 
room air with even and unlabored respirations, no S/S of distress/SOB. Patient reports 
tolerating dinner with no problems. patient denies difficulty swallowing. patient turns 
independently in bed. Instructed on POC and to call for assist PRN, will continue to monitor 
for changes Q1hr and PRN.

## 2020-06-18 NOTE — NUR
MD KIMBER LOVING AT BEDSIDE. MD STATES " PLEASE PASS ON AND ALERT THE MD. DO NOT USE LEVAQUIN OR 
AZITHROMYCIN DUE TO THESE MEDICATIONS CAUSING COMPLICATIONS AND EXACERBATING HIS MYASTHENIA 
GRAVIS"

## 2020-06-18 NOTE — NUR
MD ROUNDING

MD OSCAR ZARAGOZA AT BEDSIDE. ALL QUESTIONS AND CONCERNS ADDRESSED AT THIS TIME. MD UPDATED ON NEW 
CARDIOLOGIST UGO HUBBARD AND TO CALL MD TO UPDATE ON CONSULT AND POC.

## 2020-06-18 NOTE — NUR
Nutrition Followup Notes



WT: 117.2 kg



Pt reports appetite is good.  Pt intake is adequate aeb pt PO intake of 100% of meals per RN 
nutrition doc. Pt with no noted distress per RN doc.  Will continue to closely monitor 
pertinent labs, PO intake and skin status prn. Will followup in 3-5 days



Est Energy needs: 0628-1863 kcals (14-18 kcal/kgBW), Est Protein needs: 73-87g (1-1.2g/kg 
IBW 72.7kg). Will continue to monitor and reassess prn.



LABS: Alb 3.0 L



GI: Last BM noted on 6/18 per RN doc.



BS: 21 low risk, Please refer to wound assessment report for full details.



PES: Resolved, pt with regular diet  1) Increased nutrient needs  aeb pt with dysphagia, 
pending swallow eval r/t pt with no PO intake

2) Obesity aeb 152% IBW and BMI of 36.0 kg/m2  r/t energy intake in excess of energy needs





Comments 

Will continue to closely monitor pertinent labs, PO intake and skin status prn. Will 
followup in 3-5 days

     

1)Pt po intake >75% 2) Continue current plan of care

## 2020-06-19 VITALS — SYSTOLIC BLOOD PRESSURE: 113 MMHG | DIASTOLIC BLOOD PRESSURE: 70 MMHG

## 2020-06-19 VITALS — SYSTOLIC BLOOD PRESSURE: 122 MMHG | DIASTOLIC BLOOD PRESSURE: 65 MMHG

## 2020-06-19 VITALS — DIASTOLIC BLOOD PRESSURE: 66 MMHG | SYSTOLIC BLOOD PRESSURE: 125 MMHG

## 2020-06-19 VITALS — DIASTOLIC BLOOD PRESSURE: 78 MMHG | SYSTOLIC BLOOD PRESSURE: 110 MMHG

## 2020-06-19 VITALS — SYSTOLIC BLOOD PRESSURE: 109 MMHG | DIASTOLIC BLOOD PRESSURE: 63 MMHG

## 2020-06-19 RX ADMIN — PYRIDOSTIGMINE BROMIDE SCH MG: 60 TABLET ORAL at 21:09

## 2020-06-19 RX ADMIN — ATORVASTATIN CALCIUM SCH MG: 20 TABLET, FILM COATED ORAL at 21:08

## 2020-06-19 RX ADMIN — IPRATROPIUM BROMIDE SCH MG: 0.5 SOLUTION RESPIRATORY (INHALATION) at 18:38

## 2020-06-19 RX ADMIN — LEVOTHYROXINE SODIUM SCH MCG: 100 TABLET ORAL at 06:17

## 2020-06-19 RX ADMIN — SODIUM CHLORIDE SCH ML: 9 INJECTION INTRAMUSCULAR; INTRAVENOUS; SUBCUTANEOUS at 21:08

## 2020-06-19 RX ADMIN — PYRIDOSTIGMINE BROMIDE SCH MG: 60 TABLET ORAL at 18:20

## 2020-06-19 RX ADMIN — PANTOPRAZOLE SODIUM SCH MG: 40 INJECTION, POWDER, FOR SOLUTION INTRAVENOUS at 21:08

## 2020-06-19 RX ADMIN — PYRIDOSTIGMINE BROMIDE SCH MG: 60 TABLET ORAL at 12:19

## 2020-06-19 RX ADMIN — IPRATROPIUM BROMIDE SCH MG: 0.5 SOLUTION RESPIRATORY (INHALATION) at 10:47

## 2020-06-19 RX ADMIN — ENOXAPARIN SODIUM SCH MG: 40 INJECTION SUBCUTANEOUS at 10:38

## 2020-06-19 RX ADMIN — ALBUTEROL SULFATE SCH MG: 2.5 SOLUTION RESPIRATORY (INHALATION) at 10:47

## 2020-06-19 RX ADMIN — ALBUTEROL SULFATE SCH MG: 2.5 SOLUTION RESPIRATORY (INHALATION) at 06:01

## 2020-06-19 RX ADMIN — BUDESONIDE SCH MG: 0.5 SUSPENSION RESPIRATORY (INHALATION) at 22:44

## 2020-06-19 RX ADMIN — BUDESONIDE SCH MG: 0.5 SUSPENSION RESPIRATORY (INHALATION) at 06:01

## 2020-06-19 RX ADMIN — ALBUTEROL SULFATE SCH MG: 2.5 SOLUTION RESPIRATORY (INHALATION) at 02:11

## 2020-06-19 RX ADMIN — IPRATROPIUM BROMIDE SCH MG: 0.5 SOLUTION RESPIRATORY (INHALATION) at 02:11

## 2020-06-19 RX ADMIN — SODIUM CHLORIDE SCH ML: 9 INJECTION INTRAMUSCULAR; INTRAVENOUS; SUBCUTANEOUS at 16:32

## 2020-06-19 RX ADMIN — SODIUM CHLORIDE SCH ML: 9 INJECTION INTRAMUSCULAR; INTRAVENOUS; SUBCUTANEOUS at 05:55

## 2020-06-19 RX ADMIN — ALBUTEROL SULFATE SCH MG: 2.5 SOLUTION RESPIRATORY (INHALATION) at 18:38

## 2020-06-19 RX ADMIN — ALBUTEROL SULFATE SCH MG: 2.5 SOLUTION RESPIRATORY (INHALATION) at 14:46

## 2020-06-19 RX ADMIN — PYRIDOSTIGMINE BROMIDE SCH MG: 60 TABLET ORAL at 06:17

## 2020-06-19 RX ADMIN — IPRATROPIUM BROMIDE SCH MG: 0.5 SOLUTION RESPIRATORY (INHALATION) at 14:46

## 2020-06-19 RX ADMIN — ALBUTEROL SULFATE SCH MG: 2.5 SOLUTION RESPIRATORY (INHALATION) at 22:44

## 2020-06-19 RX ADMIN — PANTOPRAZOLE SODIUM SCH MG: 40 INJECTION, POWDER, FOR SOLUTION INTRAVENOUS at 10:37

## 2020-06-19 RX ADMIN — IPRATROPIUM BROMIDE SCH MG: 0.5 SOLUTION RESPIRATORY (INHALATION) at 22:44

## 2020-06-19 RX ADMIN — IPRATROPIUM BROMIDE SCH MG: 0.5 SOLUTION RESPIRATORY (INHALATION) at 06:01

## 2020-06-19 NOTE — NUR
SPOKE WITH DR. OSCAR ZARAGOZA

DISCUSSED THE DISCHARGE WITH DR. OSCAR ZARAGOZA AND THE [facility] DOCTOR, DR. KISER, REGARDING PATIENT 
DISCHARGE AND PRESCRIPTIONS. DR. OSCAR ZARAGOZA AND RN NOTIFIED AND UPDATED DR. KISER ON THE 
IMPORTANCE OF THE MEDICATION PYRIDOSTIGMINE 60 MG FOR THE PATIENT FOUR TIMES A DAY AND THE 
NEED FOR THE AVAILABILITY OF THE MEDICATION IMMEDIATELY AT THE FACILITY. DR. KISER 
VERBALIZED UNDERSTANDING AND STATED THAT HE WILL BE ABLE TO HAVE THE MEDICATION AVAILABLE BY 
MONDAY OR TUESDAY NEXT WEEK AND TO KEEP THE PATIENT ADMITTED AT [facility] UNTIL THE MEDICATION IS 
AVAILABLE. PER DR. KISER, HE WILL NOTIFY THE ATTENDING DOCTOR, DR. OSCAR ZARAGOZA WHEN THE 
MEDICATION IS AVAILABLE.

## 2020-06-19 NOTE — NUR
SPOKE TO DR. OSCAR ZARAGOZA.

PER DR. ZARAGOZA, DR. HUBBARD WILL SEE THE PATIENT AS AN OUT PATIENT. PER DR. ZARAGOZA, THE 
DISCHARGE SUMMARY IS TO BE SENT WITH THE PATIENT IN THE DISCHARGE PACKET AND THE 
PRESCRIPTION WILL BE SENT TO THE UNIT TODAY.

## 2020-06-19 NOTE — NUR
Opening Shift Note

Assumed care of patient, awake and alert.  No S/S of distress/SOB or pain.  Instructed on 
POC and to call for assist PRN, will continue to monitor for changes Q1hr and PRN. Side 
rails up x2. Bed locked in lowest position. Call light within reach.

## 2020-06-19 NOTE — NUR
Opening Shift Note

Assumed care of patient, awake and alert. No S/S of distress/SOB or pain. Bed in lowest 
locked position, call light within reach, side rails up x 2. Instructed on POC and to call 
for assist PRN, will continue to monitor for changes Q1hr and PRN.

## 2020-06-20 VITALS — SYSTOLIC BLOOD PRESSURE: 113 MMHG | DIASTOLIC BLOOD PRESSURE: 79 MMHG

## 2020-06-20 VITALS — SYSTOLIC BLOOD PRESSURE: 110 MMHG | DIASTOLIC BLOOD PRESSURE: 78 MMHG

## 2020-06-20 VITALS — SYSTOLIC BLOOD PRESSURE: 119 MMHG | DIASTOLIC BLOOD PRESSURE: 68 MMHG

## 2020-06-20 VITALS — DIASTOLIC BLOOD PRESSURE: 73 MMHG | SYSTOLIC BLOOD PRESSURE: 127 MMHG

## 2020-06-20 VITALS — SYSTOLIC BLOOD PRESSURE: 95 MMHG | DIASTOLIC BLOOD PRESSURE: 57 MMHG

## 2020-06-20 VITALS — DIASTOLIC BLOOD PRESSURE: 69 MMHG | SYSTOLIC BLOOD PRESSURE: 127 MMHG

## 2020-06-20 RX ADMIN — PYRIDOSTIGMINE BROMIDE SCH MG: 60 TABLET ORAL at 18:10

## 2020-06-20 RX ADMIN — IPRATROPIUM BROMIDE SCH MG: 0.5 SOLUTION RESPIRATORY (INHALATION) at 13:58

## 2020-06-20 RX ADMIN — IPRATROPIUM BROMIDE SCH MG: 0.5 SOLUTION RESPIRATORY (INHALATION) at 18:47

## 2020-06-20 RX ADMIN — IPRATROPIUM BROMIDE SCH MG: 0.5 SOLUTION RESPIRATORY (INHALATION) at 10:08

## 2020-06-20 RX ADMIN — PYRIDOSTIGMINE BROMIDE SCH MG: 60 TABLET ORAL at 13:07

## 2020-06-20 RX ADMIN — ALBUTEROL SULFATE SCH MG: 2.5 SOLUTION RESPIRATORY (INHALATION) at 13:58

## 2020-06-20 RX ADMIN — ALBUTEROL SULFATE SCH MG: 2.5 SOLUTION RESPIRATORY (INHALATION) at 06:42

## 2020-06-20 RX ADMIN — ALBUTEROL SULFATE SCH MG: 2.5 SOLUTION RESPIRATORY (INHALATION) at 02:15

## 2020-06-20 RX ADMIN — ALBUTEROL SULFATE SCH MG: 2.5 SOLUTION RESPIRATORY (INHALATION) at 10:08

## 2020-06-20 RX ADMIN — PANTOPRAZOLE SODIUM SCH MG: 40 INJECTION, POWDER, FOR SOLUTION INTRAVENOUS at 10:56

## 2020-06-20 RX ADMIN — ENOXAPARIN SODIUM SCH MG: 40 INJECTION SUBCUTANEOUS at 10:56

## 2020-06-20 RX ADMIN — PYRIDOSTIGMINE BROMIDE SCH MG: 60 TABLET ORAL at 21:40

## 2020-06-20 RX ADMIN — ATORVASTATIN CALCIUM SCH MG: 20 TABLET, FILM COATED ORAL at 21:20

## 2020-06-20 RX ADMIN — BUDESONIDE SCH MG: 0.5 SUSPENSION RESPIRATORY (INHALATION) at 22:13

## 2020-06-20 RX ADMIN — LEVOTHYROXINE SODIUM SCH MCG: 100 TABLET ORAL at 06:16

## 2020-06-20 RX ADMIN — PYRIDOSTIGMINE BROMIDE SCH MG: 60 TABLET ORAL at 06:16

## 2020-06-20 RX ADMIN — SODIUM CHLORIDE SCH ML: 9 INJECTION INTRAMUSCULAR; INTRAVENOUS; SUBCUTANEOUS at 06:16

## 2020-06-20 RX ADMIN — SODIUM CHLORIDE SCH ML: 9 INJECTION INTRAMUSCULAR; INTRAVENOUS; SUBCUTANEOUS at 15:21

## 2020-06-20 RX ADMIN — PANTOPRAZOLE SODIUM SCH MG: 40 INJECTION, POWDER, FOR SOLUTION INTRAVENOUS at 21:21

## 2020-06-20 RX ADMIN — IPRATROPIUM BROMIDE SCH MG: 0.5 SOLUTION RESPIRATORY (INHALATION) at 06:42

## 2020-06-20 RX ADMIN — IPRATROPIUM BROMIDE SCH MG: 0.5 SOLUTION RESPIRATORY (INHALATION) at 02:15

## 2020-06-20 RX ADMIN — IPRATROPIUM BROMIDE SCH MG: 0.5 SOLUTION RESPIRATORY (INHALATION) at 22:15

## 2020-06-20 RX ADMIN — SODIUM CHLORIDE SCH ML: 9 INJECTION INTRAMUSCULAR; INTRAVENOUS; SUBCUTANEOUS at 21:21

## 2020-06-20 RX ADMIN — BUDESONIDE SCH MG: 0.5 SUSPENSION RESPIRATORY (INHALATION) at 06:42

## 2020-06-20 RX ADMIN — ALBUTEROL SULFATE SCH MG: 2.5 SOLUTION RESPIRATORY (INHALATION) at 22:14

## 2020-06-20 RX ADMIN — ALBUTEROL SULFATE SCH MG: 2.5 SOLUTION RESPIRATORY (INHALATION) at 18:46

## 2020-06-20 NOTE — NUR
Opening Shift Note

Assumed care of patient, awake and alert.  No S/S of distress/SOB or pain. Bed in lowest and 
locked position with side rails up x2 and call light in reach. Instructed on POC and to call 
for assist PRN, will continue to monitor for changes Q1hr and PRN.

## 2020-06-21 VITALS — SYSTOLIC BLOOD PRESSURE: 111 MMHG | DIASTOLIC BLOOD PRESSURE: 67 MMHG

## 2020-06-21 VITALS — DIASTOLIC BLOOD PRESSURE: 75 MMHG | SYSTOLIC BLOOD PRESSURE: 110 MMHG

## 2020-06-21 VITALS — DIASTOLIC BLOOD PRESSURE: 60 MMHG | SYSTOLIC BLOOD PRESSURE: 96 MMHG

## 2020-06-21 VITALS — SYSTOLIC BLOOD PRESSURE: 129 MMHG | DIASTOLIC BLOOD PRESSURE: 73 MMHG

## 2020-06-21 VITALS — SYSTOLIC BLOOD PRESSURE: 119 MMHG | DIASTOLIC BLOOD PRESSURE: 73 MMHG

## 2020-06-21 RX ADMIN — SODIUM CHLORIDE SCH ML: 9 INJECTION INTRAMUSCULAR; INTRAVENOUS; SUBCUTANEOUS at 21:45

## 2020-06-21 RX ADMIN — ENOXAPARIN SODIUM SCH MG: 40 INJECTION SUBCUTANEOUS at 10:05

## 2020-06-21 RX ADMIN — ALBUTEROL SULFATE SCH MG: 2.5 SOLUTION RESPIRATORY (INHALATION) at 02:16

## 2020-06-21 RX ADMIN — IPRATROPIUM BROMIDE SCH MG: 0.5 SOLUTION RESPIRATORY (INHALATION) at 22:28

## 2020-06-21 RX ADMIN — PANTOPRAZOLE SODIUM SCH MG: 40 INJECTION, POWDER, FOR SOLUTION INTRAVENOUS at 21:44

## 2020-06-21 RX ADMIN — ATORVASTATIN CALCIUM SCH MG: 20 TABLET, FILM COATED ORAL at 21:45

## 2020-06-21 RX ADMIN — BUDESONIDE SCH MG: 0.5 SUSPENSION RESPIRATORY (INHALATION) at 09:33

## 2020-06-21 RX ADMIN — IPRATROPIUM BROMIDE SCH MG: 0.5 SOLUTION RESPIRATORY (INHALATION) at 14:13

## 2020-06-21 RX ADMIN — IPRATROPIUM BROMIDE SCH MG: 0.5 SOLUTION RESPIRATORY (INHALATION) at 02:16

## 2020-06-21 RX ADMIN — BUDESONIDE SCH MG: 0.5 SUSPENSION RESPIRATORY (INHALATION) at 22:28

## 2020-06-21 RX ADMIN — ALBUTEROL SULFATE SCH MG: 2.5 SOLUTION RESPIRATORY (INHALATION) at 18:08

## 2020-06-21 RX ADMIN — ALBUTEROL SULFATE SCH MG: 2.5 SOLUTION RESPIRATORY (INHALATION) at 14:13

## 2020-06-21 RX ADMIN — PYRIDOSTIGMINE BROMIDE SCH MG: 60 TABLET ORAL at 21:45

## 2020-06-21 RX ADMIN — IPRATROPIUM BROMIDE SCH MG: 0.5 SOLUTION RESPIRATORY (INHALATION) at 18:08

## 2020-06-21 RX ADMIN — SODIUM CHLORIDE SCH ML: 9 INJECTION INTRAMUSCULAR; INTRAVENOUS; SUBCUTANEOUS at 13:46

## 2020-06-21 RX ADMIN — ALBUTEROL SULFATE SCH MG: 2.5 SOLUTION RESPIRATORY (INHALATION) at 06:33

## 2020-06-21 RX ADMIN — ALBUTEROL SULFATE SCH MG: 2.5 SOLUTION RESPIRATORY (INHALATION) at 22:28

## 2020-06-21 RX ADMIN — IPRATROPIUM BROMIDE SCH MG: 0.5 SOLUTION RESPIRATORY (INHALATION) at 09:32

## 2020-06-21 RX ADMIN — PYRIDOSTIGMINE BROMIDE SCH MG: 60 TABLET ORAL at 18:28

## 2020-06-21 RX ADMIN — PYRIDOSTIGMINE BROMIDE SCH MG: 60 TABLET ORAL at 13:09

## 2020-06-21 RX ADMIN — ALBUTEROL SULFATE SCH MG: 2.5 SOLUTION RESPIRATORY (INHALATION) at 09:32

## 2020-06-21 RX ADMIN — PANTOPRAZOLE SODIUM SCH MG: 40 INJECTION, POWDER, FOR SOLUTION INTRAVENOUS at 10:04

## 2020-06-21 RX ADMIN — IPRATROPIUM BROMIDE SCH MG: 0.5 SOLUTION RESPIRATORY (INHALATION) at 06:33

## 2020-06-21 RX ADMIN — PYRIDOSTIGMINE BROMIDE SCH MG: 60 TABLET ORAL at 05:16

## 2020-06-21 RX ADMIN — SODIUM CHLORIDE SCH ML: 9 INJECTION INTRAMUSCULAR; INTRAVENOUS; SUBCUTANEOUS at 05:16

## 2020-06-21 RX ADMIN — LEVOTHYROXINE SODIUM SCH MCG: 100 TABLET ORAL at 05:52

## 2020-06-21 NOTE — NUR
IV insertion AND IV removal

LEFT HAND IV DC'd with clean sterile technique, catheter fully intact. Pressure dressing 
applied to site. Patient tolerated well.

IV access obtained, via clean sterile technique by inserting 20 gauge catheter at RIGHT AC 
after 2 attempt(s). IV secured properly. No trauma to site. Patient tolerated well.

## 2020-06-21 NOTE — NUR
Nutrition Followup Notes



WT: 110.5 kg



Pt appetite is good aeb 100% PO intake over 5 meals per RN doc. Pt with no distress, feeling 
better.  Will continue to closely monitor pertinent labs, PO intake and skin status prn. 
Will followup in 3-5 days



Est Energy needs: 6375-3049 kcals (14-18 kcal/kgBW), Est Protein needs: 73-87g (1-1.2g/kg 
IBW 72.7kg). Will continue to monitor and reassess prn.



LABS: Na 133 L, Alb 3.0 L



GI: Last BM noted on 6/18 per RN doc.



BS: 21 low risk, Please refer to wound assessment report for full details.



PES: Resolved, pt with regular diet  1) Increased nutrient needs  aeb pt with dysphagia, 
pending swallow eval r/t pt with no PO intake

2) Obesity aeb 152% IBW and BMI of 36.0 kg/m2  r/t energy intake in excess of energy needs





Comments 

Will continue to closely monitor pertinent labs, PO intake and skin status prn. Will 
followup in 3-5 days

     

1)Pt po intake >75% 2) Continue current plan of care

## 2020-06-22 VITALS — SYSTOLIC BLOOD PRESSURE: 110 MMHG | DIASTOLIC BLOOD PRESSURE: 66 MMHG

## 2020-06-22 VITALS — SYSTOLIC BLOOD PRESSURE: 131 MMHG | DIASTOLIC BLOOD PRESSURE: 78 MMHG

## 2020-06-22 VITALS — DIASTOLIC BLOOD PRESSURE: 78 MMHG | SYSTOLIC BLOOD PRESSURE: 131 MMHG

## 2020-06-22 VITALS — SYSTOLIC BLOOD PRESSURE: 152 MMHG | DIASTOLIC BLOOD PRESSURE: 97 MMHG

## 2020-06-22 VITALS — SYSTOLIC BLOOD PRESSURE: 123 MMHG | DIASTOLIC BLOOD PRESSURE: 70 MMHG

## 2020-06-22 VITALS — DIASTOLIC BLOOD PRESSURE: 79 MMHG | SYSTOLIC BLOOD PRESSURE: 127 MMHG

## 2020-06-22 RX ADMIN — IPRATROPIUM BROMIDE SCH MG: 0.5 SOLUTION RESPIRATORY (INHALATION) at 07:26

## 2020-06-22 RX ADMIN — ALBUTEROL SULFATE SCH MG: 2.5 SOLUTION RESPIRATORY (INHALATION) at 14:58

## 2020-06-22 RX ADMIN — PANTOPRAZOLE SODIUM SCH MG: 40 INJECTION, POWDER, FOR SOLUTION INTRAVENOUS at 22:01

## 2020-06-22 RX ADMIN — SODIUM CHLORIDE SCH ML: 9 INJECTION INTRAMUSCULAR; INTRAVENOUS; SUBCUTANEOUS at 22:01

## 2020-06-22 RX ADMIN — ALBUTEROL SULFATE SCH MG: 2.5 SOLUTION RESPIRATORY (INHALATION) at 11:00

## 2020-06-22 RX ADMIN — LEVOTHYROXINE SODIUM SCH MCG: 100 TABLET ORAL at 06:28

## 2020-06-22 RX ADMIN — PYRIDOSTIGMINE BROMIDE SCH MG: 60 TABLET ORAL at 05:38

## 2020-06-22 RX ADMIN — BUDESONIDE SCH MG: 0.5 SUSPENSION RESPIRATORY (INHALATION) at 22:10

## 2020-06-22 RX ADMIN — IPRATROPIUM BROMIDE SCH MG: 0.5 SOLUTION RESPIRATORY (INHALATION) at 22:10

## 2020-06-22 RX ADMIN — ALBUTEROL SULFATE SCH MG: 2.5 SOLUTION RESPIRATORY (INHALATION) at 02:20

## 2020-06-22 RX ADMIN — IPRATROPIUM BROMIDE SCH MG: 0.5 SOLUTION RESPIRATORY (INHALATION) at 02:20

## 2020-06-22 RX ADMIN — ALBUTEROL SULFATE SCH MG: 2.5 SOLUTION RESPIRATORY (INHALATION) at 07:26

## 2020-06-22 RX ADMIN — BUDESONIDE SCH MG: 0.5 SUSPENSION RESPIRATORY (INHALATION) at 11:00

## 2020-06-22 RX ADMIN — IPRATROPIUM BROMIDE SCH MG: 0.5 SOLUTION RESPIRATORY (INHALATION) at 18:31

## 2020-06-22 RX ADMIN — PYRIDOSTIGMINE BROMIDE SCH MG: 60 TABLET ORAL at 11:49

## 2020-06-22 RX ADMIN — IPRATROPIUM BROMIDE SCH MG: 0.5 SOLUTION RESPIRATORY (INHALATION) at 14:58

## 2020-06-22 RX ADMIN — PYRIDOSTIGMINE BROMIDE SCH MG: 60 TABLET ORAL at 17:26

## 2020-06-22 RX ADMIN — SODIUM CHLORIDE SCH ML: 9 INJECTION INTRAMUSCULAR; INTRAVENOUS; SUBCUTANEOUS at 11:49

## 2020-06-22 RX ADMIN — PYRIDOSTIGMINE BROMIDE SCH MG: 60 TABLET ORAL at 22:02

## 2020-06-22 RX ADMIN — ATORVASTATIN CALCIUM SCH MG: 20 TABLET, FILM COATED ORAL at 22:02

## 2020-06-22 RX ADMIN — ALBUTEROL SULFATE SCH MG: 2.5 SOLUTION RESPIRATORY (INHALATION) at 18:31

## 2020-06-22 RX ADMIN — ALBUTEROL SULFATE SCH MG: 2.5 SOLUTION RESPIRATORY (INHALATION) at 22:10

## 2020-06-22 RX ADMIN — PANTOPRAZOLE SODIUM SCH MG: 40 INJECTION, POWDER, FOR SOLUTION INTRAVENOUS at 10:24

## 2020-06-22 RX ADMIN — SODIUM CHLORIDE SCH ML: 9 INJECTION INTRAMUSCULAR; INTRAVENOUS; SUBCUTANEOUS at 05:38

## 2020-06-22 RX ADMIN — IPRATROPIUM BROMIDE SCH MG: 0.5 SOLUTION RESPIRATORY (INHALATION) at 11:00

## 2020-06-22 NOTE — NUR
Care report given to Kiran Olmstead, patient is resting no distress, given update report to the 
[facility] .

## 2020-06-22 NOTE — NUR
SCHEDULED ORAL MEDICATION ADMINISTERED AS ORDERED. PATIENT SWALLOWED WELL. NO S/S OF 
ASPIRATION NOTED. CONTINUE TO MONITOR.

## 2020-06-22 NOTE — NUR
RECEIVED PATIENT FROM DAY SHIFT RN. PATIENT RESTING IN BED. NO S/S OF DISTRESS NOTED. DENIED 
PAIN FOR NOW. POC INSTRUCTED AND ENCOURAGED PATIENT TO CALL FOR ASSISTANT IF NEEDED. BED IN 
LOWEST POSITION WITH SIDE RAILS UP X 2. CALL BELL WITHIN REACH. GUARDS AT BEDSIDE. CONTINUE 
TO MONITOR FOR CHANGES Q1H AND PRN.

## 2020-06-23 VITALS — DIASTOLIC BLOOD PRESSURE: 73 MMHG | SYSTOLIC BLOOD PRESSURE: 133 MMHG

## 2020-06-23 VITALS — SYSTOLIC BLOOD PRESSURE: 119 MMHG | DIASTOLIC BLOOD PRESSURE: 74 MMHG

## 2020-06-23 VITALS — DIASTOLIC BLOOD PRESSURE: 80 MMHG | SYSTOLIC BLOOD PRESSURE: 112 MMHG

## 2020-06-23 VITALS — DIASTOLIC BLOOD PRESSURE: 66 MMHG | SYSTOLIC BLOOD PRESSURE: 105 MMHG

## 2020-06-23 VITALS — SYSTOLIC BLOOD PRESSURE: 139 MMHG | DIASTOLIC BLOOD PRESSURE: 77 MMHG

## 2020-06-23 RX ADMIN — SODIUM CHLORIDE SCH ML: 9 INJECTION INTRAMUSCULAR; INTRAVENOUS; SUBCUTANEOUS at 06:03

## 2020-06-23 RX ADMIN — IPRATROPIUM BROMIDE SCH MG: 0.5 SOLUTION RESPIRATORY (INHALATION) at 18:31

## 2020-06-23 RX ADMIN — ALBUTEROL SULFATE SCH MG: 2.5 SOLUTION RESPIRATORY (INHALATION) at 09:50

## 2020-06-23 RX ADMIN — PYRIDOSTIGMINE BROMIDE SCH MG: 60 TABLET ORAL at 18:14

## 2020-06-23 RX ADMIN — IPRATROPIUM BROMIDE SCH MG: 0.5 SOLUTION RESPIRATORY (INHALATION) at 09:50

## 2020-06-23 RX ADMIN — BUDESONIDE SCH MG: 0.5 SUSPENSION RESPIRATORY (INHALATION) at 06:58

## 2020-06-23 RX ADMIN — BUDESONIDE SCH MG: 0.5 SUSPENSION RESPIRATORY (INHALATION) at 18:31

## 2020-06-23 RX ADMIN — ATORVASTATIN CALCIUM SCH MG: 20 TABLET, FILM COATED ORAL at 21:49

## 2020-06-23 RX ADMIN — ACETYLCYSTEINE SCH MG: 200 INHALANT RESPIRATORY (INHALATION) at 22:19

## 2020-06-23 RX ADMIN — IPRATROPIUM BROMIDE SCH MG: 0.5 SOLUTION RESPIRATORY (INHALATION) at 06:58

## 2020-06-23 RX ADMIN — PYRIDOSTIGMINE BROMIDE SCH MG: 60 TABLET ORAL at 21:49

## 2020-06-23 RX ADMIN — PYRIDOSTIGMINE BROMIDE SCH MG: 60 TABLET ORAL at 06:04

## 2020-06-23 RX ADMIN — SODIUM CHLORIDE SCH ML: 9 INJECTION INTRAMUSCULAR; INTRAVENOUS; SUBCUTANEOUS at 21:49

## 2020-06-23 RX ADMIN — IPRATROPIUM BROMIDE SCH MG: 0.5 SOLUTION RESPIRATORY (INHALATION) at 14:15

## 2020-06-23 RX ADMIN — PANTOPRAZOLE SODIUM SCH MG: 40 INJECTION, POWDER, FOR SOLUTION INTRAVENOUS at 10:17

## 2020-06-23 RX ADMIN — IPRATROPIUM BROMIDE SCH MG: 0.5 SOLUTION RESPIRATORY (INHALATION) at 02:20

## 2020-06-23 RX ADMIN — IPRATROPIUM BROMIDE SCH MG: 0.5 SOLUTION RESPIRATORY (INHALATION) at 22:18

## 2020-06-23 RX ADMIN — ALBUTEROL SULFATE SCH MG: 2.5 SOLUTION RESPIRATORY (INHALATION) at 22:18

## 2020-06-23 RX ADMIN — PYRIDOSTIGMINE BROMIDE SCH MG: 60 TABLET ORAL at 12:42

## 2020-06-23 RX ADMIN — ALBUTEROL SULFATE SCH MG: 2.5 SOLUTION RESPIRATORY (INHALATION) at 06:58

## 2020-06-23 RX ADMIN — SODIUM CHLORIDE SCH ML: 9 INJECTION INTRAMUSCULAR; INTRAVENOUS; SUBCUTANEOUS at 14:22

## 2020-06-23 RX ADMIN — PANTOPRAZOLE SODIUM SCH MG: 40 INJECTION, POWDER, FOR SOLUTION INTRAVENOUS at 21:49

## 2020-06-23 RX ADMIN — ALBUTEROL SULFATE SCH MG: 2.5 SOLUTION RESPIRATORY (INHALATION) at 02:20

## 2020-06-23 RX ADMIN — LEVOTHYROXINE SODIUM SCH MCG: 100 TABLET ORAL at 06:04

## 2020-06-23 RX ADMIN — ALBUTEROL SULFATE SCH MG: 2.5 SOLUTION RESPIRATORY (INHALATION) at 18:31

## 2020-06-23 RX ADMIN — ALBUTEROL SULFATE SCH MG: 2.5 SOLUTION RESPIRATORY (INHALATION) at 14:15

## 2020-06-23 NOTE — NUR
Opening Shift Note

Assuming care of patient at this time. Patient is awake and alert. Patient denies pain. 
Patient shows no signs or symptoms of distress or shortness of breath. Instructed patient on 
the plan of care for today and to call for assistance as needed. Call light within reach. 
Will continue to round hourly and as needed. Guards at bedside.

## 2020-06-24 VITALS — DIASTOLIC BLOOD PRESSURE: 79 MMHG | SYSTOLIC BLOOD PRESSURE: 123 MMHG

## 2020-06-24 VITALS — SYSTOLIC BLOOD PRESSURE: 117 MMHG | DIASTOLIC BLOOD PRESSURE: 62 MMHG

## 2020-06-24 VITALS — DIASTOLIC BLOOD PRESSURE: 70 MMHG | SYSTOLIC BLOOD PRESSURE: 126 MMHG

## 2020-06-24 VITALS — DIASTOLIC BLOOD PRESSURE: 72 MMHG | SYSTOLIC BLOOD PRESSURE: 116 MMHG

## 2020-06-24 VITALS — SYSTOLIC BLOOD PRESSURE: 127 MMHG | DIASTOLIC BLOOD PRESSURE: 72 MMHG

## 2020-06-24 RX ADMIN — IPRATROPIUM BROMIDE SCH MG: 0.5 SOLUTION RESPIRATORY (INHALATION) at 06:56

## 2020-06-24 RX ADMIN — BUDESONIDE SCH MG: 0.5 SUSPENSION RESPIRATORY (INHALATION) at 11:04

## 2020-06-24 RX ADMIN — ALBUTEROL SULFATE SCH MG: 2.5 SOLUTION RESPIRATORY (INHALATION) at 02:20

## 2020-06-24 RX ADMIN — PYRIDOSTIGMINE BROMIDE SCH MG: 60 TABLET ORAL at 17:42

## 2020-06-24 RX ADMIN — BUDESONIDE SCH MG: 0.5 SUSPENSION RESPIRATORY (INHALATION) at 22:10

## 2020-06-24 RX ADMIN — ALBUTEROL SULFATE SCH MG: 2.5 SOLUTION RESPIRATORY (INHALATION) at 15:15

## 2020-06-24 RX ADMIN — IPRATROPIUM BROMIDE SCH MG: 0.5 SOLUTION RESPIRATORY (INHALATION) at 15:15

## 2020-06-24 RX ADMIN — PYRIDOSTIGMINE BROMIDE SCH MG: 60 TABLET ORAL at 12:15

## 2020-06-24 RX ADMIN — PYRIDOSTIGMINE BROMIDE SCH MG: 60 TABLET ORAL at 22:00

## 2020-06-24 RX ADMIN — ACETYLCYSTEINE SCH MG: 200 INHALANT RESPIRATORY (INHALATION) at 06:56

## 2020-06-24 RX ADMIN — ALBUTEROL SULFATE SCH MG: 2.5 SOLUTION RESPIRATORY (INHALATION) at 18:50

## 2020-06-24 RX ADMIN — LEVOTHYROXINE SODIUM SCH MCG: 100 TABLET ORAL at 06:31

## 2020-06-24 RX ADMIN — PANTOPRAZOLE SODIUM SCH MG: 40 INJECTION, POWDER, FOR SOLUTION INTRAVENOUS at 09:45

## 2020-06-24 RX ADMIN — ALBUTEROL SULFATE SCH MG: 2.5 SOLUTION RESPIRATORY (INHALATION) at 11:04

## 2020-06-24 RX ADMIN — IPRATROPIUM BROMIDE SCH MG: 0.5 SOLUTION RESPIRATORY (INHALATION) at 18:50

## 2020-06-24 RX ADMIN — ATORVASTATIN CALCIUM SCH MG: 20 TABLET, FILM COATED ORAL at 22:00

## 2020-06-24 RX ADMIN — ACETYLCYSTEINE SCH MG: 200 INHALANT RESPIRATORY (INHALATION) at 22:10

## 2020-06-24 RX ADMIN — PANTOPRAZOLE SODIUM SCH MG: 40 INJECTION, POWDER, FOR SOLUTION INTRAVENOUS at 22:00

## 2020-06-24 RX ADMIN — PYRIDOSTIGMINE BROMIDE SCH MG: 60 TABLET ORAL at 06:31

## 2020-06-24 RX ADMIN — IPRATROPIUM BROMIDE SCH MG: 0.5 SOLUTION RESPIRATORY (INHALATION) at 02:20

## 2020-06-24 RX ADMIN — ACETYLCYSTEINE SCH MG: 200 INHALANT RESPIRATORY (INHALATION) at 15:15

## 2020-06-24 RX ADMIN — ALBUTEROL SULFATE SCH MG: 2.5 SOLUTION RESPIRATORY (INHALATION) at 22:10

## 2020-06-24 RX ADMIN — ALBUTEROL SULFATE SCH MG: 2.5 SOLUTION RESPIRATORY (INHALATION) at 06:56

## 2020-06-24 RX ADMIN — SODIUM CHLORIDE SCH ML: 9 INJECTION INTRAMUSCULAR; INTRAVENOUS; SUBCUTANEOUS at 06:31

## 2020-06-24 RX ADMIN — IPRATROPIUM BROMIDE SCH MG: 0.5 SOLUTION RESPIRATORY (INHALATION) at 11:04

## 2020-06-24 RX ADMIN — IPRATROPIUM BROMIDE SCH MG: 0.5 SOLUTION RESPIRATORY (INHALATION) at 22:10

## 2020-06-24 RX ADMIN — SODIUM CHLORIDE SCH ML: 9 INJECTION INTRAMUSCULAR; INTRAVENOUS; SUBCUTANEOUS at 14:40

## 2020-06-24 RX ADMIN — SODIUM CHLORIDE SCH ML: 9 INJECTION INTRAMUSCULAR; INTRAVENOUS; SUBCUTANEOUS at 22:00

## 2020-06-24 NOTE — NUR
Nutrition Followup Notes



WT: 118.9 kg



Pt appetite is good aeb 100% PO intake per RN doc. Pt with no distress, feeling better.  
Will continue to closely monitor pertinent labs, PO intake and skin status prn. Will 
followup in 3-5 days



Est Energy needs: 2093-6780 kcals (14-18 kcal/kgBW), Est Protein needs: 73-87g (1-1.2g/kg 
IBW 72.7kg). Will continue to monitor and reassess prn.



LABS: Na 133 L, Alb 3.0 L



GI: Last BM noted on 6/22 per RN doc.



BS: 19 low risk, Please refer to wound assessment report for full details.



PES: Resolved, pt with regular diet  1) Increased nutrient needs  aeb pt with dysphagia, 
pending swallow eval r/t pt with no PO intake

2) Obesity aeb 152% IBW and BMI of 36.0 kg/m2  r/t energy intake in excess of energy needs





Comments 

Will continue to closely monitor pertinent labs, PO intake and skin status prn. Will 
followup in 3-5 days

     

1)Pt po intake >75% 2) Continue current plan of care

## 2020-06-24 NOTE — NUR
Respiratory note:

14:00 MED NEB TX NOT GIVEN. PT OFF UNIT. WILL RESUME WITH NEXT SCHEDULED TX AT 18:00. RN 
AWARE TO HAVE RT PAGED IF NEEDED.

## 2020-06-24 NOTE — NUR
Opening Shift Note

Assumed care of patient, awake, AAOx4.  No S/S of distress/SOB or pain.  Guards at bedside.  
On room air and ambulatory.  Bed in lowest locked position, side rails up x2, call light 
within reach.  Instructed on POC and to call for assist PRN, will continue to monitor for 
changes Q1hr and PRN.

## 2020-06-25 VITALS — DIASTOLIC BLOOD PRESSURE: 76 MMHG | SYSTOLIC BLOOD PRESSURE: 125 MMHG

## 2020-06-25 VITALS — DIASTOLIC BLOOD PRESSURE: 67 MMHG | SYSTOLIC BLOOD PRESSURE: 108 MMHG

## 2020-06-25 VITALS — SYSTOLIC BLOOD PRESSURE: 131 MMHG | DIASTOLIC BLOOD PRESSURE: 82 MMHG

## 2020-06-25 VITALS — SYSTOLIC BLOOD PRESSURE: 120 MMHG | DIASTOLIC BLOOD PRESSURE: 80 MMHG

## 2020-06-25 VITALS — DIASTOLIC BLOOD PRESSURE: 93 MMHG | SYSTOLIC BLOOD PRESSURE: 139 MMHG

## 2020-06-25 RX ADMIN — ALBUTEROL SULFATE SCH MG: 2.5 SOLUTION RESPIRATORY (INHALATION) at 21:55

## 2020-06-25 RX ADMIN — ALBUTEROL SULFATE SCH MG: 2.5 SOLUTION RESPIRATORY (INHALATION) at 18:36

## 2020-06-25 RX ADMIN — LEVOTHYROXINE SODIUM SCH MCG: 100 TABLET ORAL at 06:14

## 2020-06-25 RX ADMIN — PANTOPRAZOLE SODIUM SCH MG: 40 INJECTION, POWDER, FOR SOLUTION INTRAVENOUS at 10:30

## 2020-06-25 RX ADMIN — ACETYLCYSTEINE SCH MG: 200 INHALANT RESPIRATORY (INHALATION) at 05:56

## 2020-06-25 RX ADMIN — IPRATROPIUM BROMIDE SCH MG: 0.5 SOLUTION RESPIRATORY (INHALATION) at 14:12

## 2020-06-25 RX ADMIN — ALBUTEROL SULFATE SCH MG: 2.5 SOLUTION RESPIRATORY (INHALATION) at 14:12

## 2020-06-25 RX ADMIN — IPRATROPIUM BROMIDE SCH MG: 0.5 SOLUTION RESPIRATORY (INHALATION) at 10:00

## 2020-06-25 RX ADMIN — PYRIDOSTIGMINE BROMIDE SCH MG: 60 TABLET ORAL at 06:13

## 2020-06-25 RX ADMIN — ALBUTEROL SULFATE SCH MG: 2.5 SOLUTION RESPIRATORY (INHALATION) at 02:04

## 2020-06-25 RX ADMIN — SODIUM CHLORIDE SCH ML: 9 INJECTION INTRAMUSCULAR; INTRAVENOUS; SUBCUTANEOUS at 06:13

## 2020-06-25 RX ADMIN — BUDESONIDE SCH MG: 0.5 SUSPENSION RESPIRATORY (INHALATION) at 21:55

## 2020-06-25 RX ADMIN — SODIUM CHLORIDE SCH ML: 9 INJECTION INTRAMUSCULAR; INTRAVENOUS; SUBCUTANEOUS at 22:38

## 2020-06-25 RX ADMIN — ACETYLCYSTEINE SCH MG: 200 INHALANT RESPIRATORY (INHALATION) at 14:12

## 2020-06-25 RX ADMIN — IPRATROPIUM BROMIDE SCH MG: 0.5 SOLUTION RESPIRATORY (INHALATION) at 05:56

## 2020-06-25 RX ADMIN — IPRATROPIUM BROMIDE SCH MG: 0.5 SOLUTION RESPIRATORY (INHALATION) at 02:04

## 2020-06-25 RX ADMIN — ALBUTEROL SULFATE SCH MG: 2.5 SOLUTION RESPIRATORY (INHALATION) at 05:56

## 2020-06-25 RX ADMIN — PYRIDOSTIGMINE BROMIDE SCH MG: 60 TABLET ORAL at 17:41

## 2020-06-25 RX ADMIN — SODIUM CHLORIDE SCH ML: 9 INJECTION INTRAMUSCULAR; INTRAVENOUS; SUBCUTANEOUS at 15:10

## 2020-06-25 RX ADMIN — IPRATROPIUM BROMIDE SCH MG: 0.5 SOLUTION RESPIRATORY (INHALATION) at 18:36

## 2020-06-25 RX ADMIN — ALBUTEROL SULFATE SCH MG: 2.5 SOLUTION RESPIRATORY (INHALATION) at 10:00

## 2020-06-25 RX ADMIN — PYRIDOSTIGMINE BROMIDE SCH MG: 60 TABLET ORAL at 12:08

## 2020-06-25 RX ADMIN — IPRATROPIUM BROMIDE SCH MG: 0.5 SOLUTION RESPIRATORY (INHALATION) at 21:55

## 2020-06-25 RX ADMIN — ACETYLCYSTEINE SCH MG: 200 INHALANT RESPIRATORY (INHALATION) at 21:55

## 2020-06-25 RX ADMIN — PANTOPRAZOLE SODIUM SCH MG: 40 INJECTION, POWDER, FOR SOLUTION INTRAVENOUS at 22:37

## 2020-06-25 RX ADMIN — BUDESONIDE SCH MG: 0.5 SUSPENSION RESPIRATORY (INHALATION) at 10:00

## 2020-06-25 RX ADMIN — ATORVASTATIN CALCIUM SCH MG: 20 TABLET, FILM COATED ORAL at 22:38

## 2020-06-25 RX ADMIN — PYRIDOSTIGMINE BROMIDE SCH MG: 60 TABLET ORAL at 22:38

## 2020-06-25 NOTE — NUR
Opening Shift Note

Assumed care of patient, awake, alert and oriented x4, on room air with even and unlabored 
respirations, no S/S of distress/SOB or pain.  Patient able to turn in bed independently, 
bed in lowest locked position, side rails up x2, and call light within reach.  Instructed on 
POC and to call for assist PRN, will continue to monitor for changes Q1hr and PRN.

## 2020-06-25 NOTE — NUR
Closing Shift Note

Patient resting in bed. No distress noted. Guards at bedside. Report given. Will endorse 
care to the night shift RN.

## 2020-06-26 VITALS — SYSTOLIC BLOOD PRESSURE: 117 MMHG | DIASTOLIC BLOOD PRESSURE: 77 MMHG

## 2020-06-26 RX ADMIN — BUDESONIDE SCH MG: 0.5 SUSPENSION RESPIRATORY (INHALATION) at 11:36

## 2020-06-26 RX ADMIN — ACETYLCYSTEINE SCH MG: 200 INHALANT RESPIRATORY (INHALATION) at 15:09

## 2020-06-26 RX ADMIN — PANTOPRAZOLE SODIUM SCH MG: 40 INJECTION, POWDER, FOR SOLUTION INTRAVENOUS at 09:29

## 2020-06-26 RX ADMIN — SODIUM CHLORIDE SCH ML: 9 INJECTION INTRAMUSCULAR; INTRAVENOUS; SUBCUTANEOUS at 06:45

## 2020-06-26 RX ADMIN — IPRATROPIUM BROMIDE SCH MG: 0.5 SOLUTION RESPIRATORY (INHALATION) at 15:09

## 2020-06-26 RX ADMIN — IPRATROPIUM BROMIDE SCH MG: 0.5 SOLUTION RESPIRATORY (INHALATION) at 11:36

## 2020-06-26 RX ADMIN — ACETYLCYSTEINE SCH MG: 200 INHALANT RESPIRATORY (INHALATION) at 07:48

## 2020-06-26 RX ADMIN — ALBUTEROL SULFATE SCH MG: 2.5 SOLUTION RESPIRATORY (INHALATION) at 07:48

## 2020-06-26 RX ADMIN — LEVOTHYROXINE SODIUM SCH MCG: 100 TABLET ORAL at 06:46

## 2020-06-26 RX ADMIN — IPRATROPIUM BROMIDE SCH MG: 0.5 SOLUTION RESPIRATORY (INHALATION) at 07:46

## 2020-06-26 RX ADMIN — ALBUTEROL SULFATE SCH MG: 2.5 SOLUTION RESPIRATORY (INHALATION) at 11:36

## 2020-06-26 RX ADMIN — ALBUTEROL SULFATE SCH MG: 2.5 SOLUTION RESPIRATORY (INHALATION) at 14:00

## 2020-06-26 RX ADMIN — ACETYLCYSTEINE SCH MG: 200 INHALANT RESPIRATORY (INHALATION) at 14:00

## 2020-06-26 RX ADMIN — IPRATROPIUM BROMIDE SCH MG: 0.5 SOLUTION RESPIRATORY (INHALATION) at 14:00

## 2020-06-26 RX ADMIN — IPRATROPIUM BROMIDE SCH MG: 0.5 SOLUTION RESPIRATORY (INHALATION) at 01:55

## 2020-06-26 RX ADMIN — PYRIDOSTIGMINE BROMIDE SCH MG: 60 TABLET ORAL at 06:45

## 2020-06-26 RX ADMIN — PYRIDOSTIGMINE BROMIDE SCH MG: 60 TABLET ORAL at 11:59

## 2020-06-26 RX ADMIN — ALBUTEROL SULFATE SCH MG: 2.5 SOLUTION RESPIRATORY (INHALATION) at 15:09

## 2020-06-26 RX ADMIN — SODIUM CHLORIDE SCH ML: 9 INJECTION INTRAMUSCULAR; INTRAVENOUS; SUBCUTANEOUS at 14:00

## 2020-06-26 RX ADMIN — ALBUTEROL SULFATE SCH MG: 2.5 SOLUTION RESPIRATORY (INHALATION) at 01:55

## 2020-06-26 NOTE — NUR
Respiratory note:

SCHEDULED MED NEB TX NOT GIVEN. PT WAS ASLEEP AND DIDNT WANT TO DO TX AT THIS TIME. NO 
DISTRESS NOTED.

## 2020-06-26 NOTE — NUR
Discharge instructions given as ordered. Encourage to follow up with PMD as instructed. All 
questions and concerns addressed. Patient verbalized understanding.  Medication 
reconciliation form completed and copy given to patient. IV removed with catheter intact, 
pressure dressing applied.  Telemetry unit returned to ICU. Patient awaiting transportation 
from facility, accompanied by [employer] guards. No distress noted at this time.

## 2021-05-13 NOTE — NUR
Nutrition Followup Notes



WT: 113.4kg



Pt with treatment team this morning when rounding.  Pt appetite is good aeb pt po intake of 
96% x3 days per RN nutrition doc.



Est Energy needs: 9280-8221 kcals (14-18 kcal/kgBW), Est Protein needs: 73-87g (1-1.2g/kg 
IBW 72.7kg). Will continue to monitor and reassess prn.



LABS:  H, rest lab wnl



GI: Last BM noted on 6/02 per RN doc.



BS: 20 low risk, Please refer to wound assessment report for full details.



PES: Partially resolved, pt with adequate po intake x 3 days 1) Increased nutrient needs  
aeb pt with dysphagia, pending swallow eval r/t pt with no PO intake

2) Obesity aeb 152% IBW and BMI of 36.0 kg/m2  r/t energy intake in excess of energy needs





Comments 

Will continue to closely monitor pertinent labs, PO intake and skin status prn. Will 
followup in 3-5 days

     

1) continue assistance with meals. 2) consider ensure Enlive 1 carton bid if PO is low. 3) 
consider alternate nutrition support if GI is not accessible. 4) Continue current plan of 
care TEAM 1 only    Returned call to Demi (new Director of Nursing) at [facility].  Reviewed list of concerns.    Plan:  Demi will contact Dr Matson's office to obtain orders to pull PICC line and contact patient to have him return to their facility to have this removed.  Last flushed 5/11/21.    Demi will send med list, lab results and discharge summary via fax to our office this afternoon.  Demi will include details regarding follow-up plans with Dr Matson and Dr To.